# Patient Record
Sex: MALE | Race: WHITE | Employment: FULL TIME | URBAN - METROPOLITAN AREA
[De-identification: names, ages, dates, MRNs, and addresses within clinical notes are randomized per-mention and may not be internally consistent; named-entity substitution may affect disease eponyms.]

---

## 2018-04-09 ENCOUNTER — HOSPITAL ENCOUNTER (OUTPATIENT)
Age: 48
Discharge: HOME | End: 2018-04-09
Attending: EMERGENCY MEDICINE
Payer: COMMERCIAL

## 2018-04-09 VITALS
TEMPERATURE: 98 F | RESPIRATION RATE: 20 BRPM | HEIGHT: 71 IN | DIASTOLIC BLOOD PRESSURE: 105 MMHG | BODY MASS INDEX: 31.5 KG/M2 | HEART RATE: 98 BPM | OXYGEN SATURATION: 100 % | WEIGHT: 225 LBS | SYSTOLIC BLOOD PRESSURE: 188 MMHG

## 2018-04-09 DIAGNOSIS — M54.16 LUMBAR BACK PAIN WITH RADICULOPATHY AFFECTING RIGHT LOWER EXTREMITY: Primary | ICD-10-CM

## 2018-04-09 PROCEDURE — 99213 OFFICE O/P EST LOW 20 MIN: CPT

## 2018-04-09 PROCEDURE — 99204 OFFICE O/P NEW MOD 45 MIN: CPT

## 2018-04-09 RX ORDER — METHYLPREDNISOLONE 4 MG/1
TABLET ORAL
Qty: 1 PACKAGE | Refills: 0 | Status: SHIPPED | OUTPATIENT
Start: 2018-04-09 | End: 2018-05-15 | Stop reason: ALTCHOICE

## 2018-04-09 NOTE — ED NOTES
Patient present with back pain which has been an issue for him for years, however yesterday patient woke up with pain shooting down his right leg which is new for him.

## 2018-04-09 NOTE — ED PROVIDER NOTES
Patient Seen in: [facility]    History   Patient presents with:  Back Pain (musculoskeletal)    Stated Complaint: lowe back and righ leg pain    HPI  Patient reports being a have very  and having low esther use: Yes           12.0 oz/week     Standard drinks or equivalent: 20 per week      Review of Systems    Positive for stated complaint: lowe back and righ leg pain  Other systems are as noted in HPI. Constitutional and vital signs reviewed.       All other reflexes are 2+ on the right side and 2+ on the left side. Skin: Skin is warm and dry. No pallor. Psychiatric: He has a normal mood and affect. His behavior is normal.   Nursing note and vitals reviewed.       ED Course   Labs Reviewed - No data to displ

## 2018-04-12 ENCOUNTER — TELEPHONE (OUTPATIENT)
Dept: OTHER | Age: 48
End: 2018-04-12

## 2018-04-12 NOTE — TELEPHONE ENCOUNTER
Spouse states pt was seen in IC Monday for back pain and he would like doctor to prescribe stronger medication for his pain. Advised spouse to have call office as no MITCHEL available in EMR and further information is needed, she will have pt call office.

## 2018-04-13 NOTE — TELEPHONE ENCOUNTER
Pt stated he was seen at [facility] 4/9/18-RX -Steroid, few tablets left but still in large amount of Pain ,requesting Pain medication-sometimes it very intense

## 2018-04-13 NOTE — TELEPHONE ENCOUNTER
LMTCB. May transfer to Triage. (Noted pt does have appt scheduled for 4/17 with [facility]).

## 2018-04-14 ENCOUNTER — HOSPITAL ENCOUNTER (OUTPATIENT)
Age: 48
Discharge: HOME | End: 2018-04-14
Attending: FAMILY MEDICINE
Payer: COMMERCIAL

## 2018-04-14 VITALS
SYSTOLIC BLOOD PRESSURE: 167 MMHG | RESPIRATION RATE: 18 BRPM | DIASTOLIC BLOOD PRESSURE: 96 MMHG | HEART RATE: 81 BPM | TEMPERATURE: 98 F | OXYGEN SATURATION: 97 %

## 2018-04-14 DIAGNOSIS — M79.651 RIGHT THIGH PAIN: ICD-10-CM

## 2018-04-14 DIAGNOSIS — M54.41 ACUTE BACK PAIN WITH SCIATICA, RIGHT: Primary | ICD-10-CM

## 2018-04-14 PROCEDURE — 99214 OFFICE O/P EST MOD 30 MIN: CPT

## 2018-04-14 PROCEDURE — 99213 OFFICE O/P EST LOW 20 MIN: CPT

## 2018-04-14 PROCEDURE — 96372 THER/PROPH/DIAG INJ SC/IM: CPT

## 2018-04-14 RX ORDER — HYDROCODONE BITARTRATE AND ACETAMINOPHEN 5; 325 MG/1; MG/1
1-2 TABLET ORAL EVERY 4 HOURS PRN
Qty: 20 TABLET | Refills: 0 | Status: SHIPPED | OUTPATIENT
Start: 2018-04-14 | End: 2018-04-17 | Stop reason: ALTCHOICE

## 2018-04-14 RX ORDER — PREDNISONE 10 MG/1
TABLET ORAL
Qty: 21 TABLET | Refills: 0 | Status: SHIPPED | OUTPATIENT
Start: 2018-04-14 | End: 2018-05-15 | Stop reason: ALTCHOICE

## 2018-04-14 RX ORDER — METHYLPREDNISOLONE SODIUM SUCCINATE 125 MG/2ML
125 INJECTION, POWDER, LYOPHILIZED, FOR SOLUTION INTRAMUSCULAR; INTRAVENOUS ONCE
Status: COMPLETED | OUTPATIENT
Start: 2018-04-14 | End: 2018-04-14

## 2018-04-14 NOTE — ED PROVIDER NOTES
Patient Seen in: [facility] [place] History   CC:  Patient presents with:  Pain (neurologic)    Stated Complaint: Hip Pain, Lower Back Pain    ------------------------------  Per Rn: (paraphrase)    lbp, seen here recently, sp screening including reassessment of your blood pressure.     General Appearance:    Alert, cooperative, no distress, appears stated age   Head:    Normocephalic, without obvious abnormality, atraumatic   Eyes:    PERRL, conjunctiva/corneas clear, EOM's [place] Refills: 0    !! HYDROcodone-acetaminophen 5-325 MG Oral Tab  Take 1-2 tablets by mouth every 4 (four) hours as needed for Pain. Qty: 20 tablet Refills: 0    !! - Potential duplicate medications found. Please discuss with provider.

## 2018-04-14 NOTE — ED INITIAL ASSESSMENT (HPI)
C/O RIGHT HIP AND LOWER BACK PAIN SEEN HERE AT THE IC THIS PAST WEEK AND WAS PRESCRIBED A MEDROL DOSE PACK AND STATES THAT THE PAIN IS NOT IMPROVED AND HE IS UNABLE TO SLEEP DUE TO THE PAIN.

## 2018-04-17 ENCOUNTER — HOSPITAL ENCOUNTER (OUTPATIENT)
Dept: GENERAL RADIOLOGY | Age: 48
Discharge: HOME | End: 2018-04-17
Attending: FAMILY MEDICINE
Payer: COMMERCIAL

## 2018-04-17 ENCOUNTER — OFFICE VISIT (OUTPATIENT)
Dept: FAMILY MEDICINE CLINIC | Facility: CLINIC | Age: 48
End: 2018-04-17

## 2018-04-17 VITALS
WEIGHT: 203 LBS | HEART RATE: 89 BPM | BODY MASS INDEX: 28.42 KG/M2 | DIASTOLIC BLOOD PRESSURE: 116 MMHG | HEIGHT: 71 IN | SYSTOLIC BLOOD PRESSURE: 170 MMHG

## 2018-04-17 DIAGNOSIS — M54.16 LUMBAR RADICULOPATHY: ICD-10-CM

## 2018-04-17 DIAGNOSIS — M25.551 RIGHT HIP PAIN: ICD-10-CM

## 2018-04-17 DIAGNOSIS — I10 ESSENTIAL HYPERTENSION: Primary | ICD-10-CM

## 2018-04-17 DIAGNOSIS — I10 ESSENTIAL HYPERTENSION: ICD-10-CM

## 2018-04-17 PROCEDURE — 73502 X-RAY EXAM HIP UNI 2-3 VIEWS: CPT | Performed by: FAMILY MEDICINE

## 2018-04-17 PROCEDURE — 72100 X-RAY EXAM L-S SPINE 2/3 VWS: CPT | Performed by: FAMILY MEDICINE

## 2018-04-17 PROCEDURE — 99212 OFFICE O/P EST SF 10 MIN: CPT | Performed by: FAMILY MEDICINE

## 2018-04-17 PROCEDURE — 99213 OFFICE O/P EST LOW 20 MIN: CPT | Performed by: FAMILY MEDICINE

## 2018-04-17 RX ORDER — VALSARTAN 160 MG/1
160 TABLET ORAL DAILY
Qty: 30 TABLET | Refills: 1 | Status: SHIPPED | OUTPATIENT
Start: 2018-04-17 | End: 2018-06-07

## 2018-04-17 RX ORDER — HYDROCODONE BITARTRATE AND ACETAMINOPHEN 10; 325 MG/1; MG/1
1 TABLET ORAL EVERY 6 HOURS PRN
Qty: 30 TABLET | Refills: 0 | Status: SHIPPED | OUTPATIENT
Start: 2018-04-17 | End: 2018-04-24

## 2018-04-17 NOTE — PROGRESS NOTES
Blood pressure (!) 170/116, pulse 89, height 5' 11\" (1.803 m), weight 203 lb (92.1 kg). Patient presents today complaining of 9 days of hip and leg pain bilaterally. Chronic low back discomfort. Currently working a sedentary job.   Denies bowel or kira

## 2018-04-18 ENCOUNTER — TELEPHONE (OUTPATIENT)
Dept: FAMILY MEDICINE CLINIC | Facility: CLINIC | Age: 48
End: 2018-04-18

## 2018-04-18 NOTE — TELEPHONE ENCOUNTER
----- Message from Shirlene Buckley DO sent at 4/17/2018  7:49 PM CDT -----  XRAYS WITH NO ACUTE FINDINGS. PATIENT TO FU WITH DR. Celina Craft AS REFERRED.

## 2018-04-21 ENCOUNTER — LAB ENCOUNTER (OUTPATIENT)
Dept: LAB | Facility: HOSPITAL | Age: 48
End: 2018-04-21
Attending: FAMILY MEDICINE
Payer: COMMERCIAL

## 2018-04-21 DIAGNOSIS — I10 ESSENTIAL HYPERTENSION: ICD-10-CM

## 2018-04-21 DIAGNOSIS — I10 HTN (HYPERTENSION): Primary | ICD-10-CM

## 2018-04-21 PROCEDURE — 84450 TRANSFERASE (AST) (SGOT): CPT

## 2018-04-21 PROCEDURE — 93005 ELECTROCARDIOGRAM TRACING: CPT

## 2018-04-21 PROCEDURE — 85060 BLOOD SMEAR INTERPRETATION: CPT

## 2018-04-21 PROCEDURE — 81001 URINALYSIS AUTO W/SCOPE: CPT

## 2018-04-21 PROCEDURE — 80061 LIPID PANEL: CPT

## 2018-04-21 PROCEDURE — 93010 ELECTROCARDIOGRAM REPORT: CPT | Performed by: FAMILY MEDICINE

## 2018-04-21 PROCEDURE — 84443 ASSAY THYROID STIM HORMONE: CPT

## 2018-04-21 PROCEDURE — 85025 COMPLETE CBC W/AUTO DIFF WBC: CPT

## 2018-04-21 PROCEDURE — 80048 BASIC METABOLIC PNL TOTAL CA: CPT

## 2018-04-21 PROCEDURE — 36415 COLL VENOUS BLD VENIPUNCTURE: CPT

## 2018-04-21 PROCEDURE — 84460 ALANINE AMINO (ALT) (SGPT): CPT

## 2018-04-23 NOTE — TELEPHONE ENCOUNTER
Patient is asking for Norco 10/325 mg   He has apt to see ortho but not till Friday       Current Outpatient Prescriptions:  HYDROcodone-acetaminophen (NORCO)  MG Oral Tab Take 1 tablet by mouth every 6 (six) hours as needed for Pain.  Disp: 30 tablet

## 2018-04-23 NOTE — TELEPHONE ENCOUNTER
Pt called in requesting a refill for the following medication:   Pt states that he has an appt scheduled with an orthopaedic doctor for 4/27.

## 2018-04-24 ENCOUNTER — HOSPITAL ENCOUNTER (OUTPATIENT)
Age: 48
Discharge: HOME | End: 2018-04-24
Payer: COMMERCIAL

## 2018-04-24 VITALS
OXYGEN SATURATION: 98 % | HEIGHT: 71 IN | DIASTOLIC BLOOD PRESSURE: 95 MMHG | SYSTOLIC BLOOD PRESSURE: 153 MMHG | RESPIRATION RATE: 18 BRPM | BODY MASS INDEX: 28.42 KG/M2 | WEIGHT: 203 LBS | HEART RATE: 87 BPM | TEMPERATURE: 98 F

## 2018-04-24 DIAGNOSIS — M54.31 SCIATICA OF RIGHT SIDE: Primary | ICD-10-CM

## 2018-04-24 PROCEDURE — 99213 OFFICE O/P EST LOW 20 MIN: CPT

## 2018-04-24 PROCEDURE — 99214 OFFICE O/P EST MOD 30 MIN: CPT

## 2018-04-24 RX ORDER — CYCLOBENZAPRINE HCL 10 MG
10 TABLET ORAL 3 TIMES DAILY PRN
Qty: 20 TABLET | Refills: 0 | Status: SHIPPED | OUTPATIENT
Start: 2018-04-24 | End: 2018-05-01

## 2018-04-24 NOTE — ED PROVIDER NOTES
Patient presents with:  Back Pain (musculoskeletal)      HPI:     Ravi Calhoun is a 52year old male who presents for evaluation and management of a chief complaint of right-sided lower back pain that radiates to the anterior aspect of the right thigh.   Daniela Sarah Yes    Comment: Coffee 1 cup daily     Social History Narrative   None on file         ROS:   Positive for stated complaint: back pain, thigh pain  All other systems reviewed and negative except as noted above. Constitutional and Vital Signs Reviewed. prescribe him another course of Norco when he has a primary care doctor following his care along with an appointment with his orthopedic doctor in 2 days. He agrees with this plan of care.   I will prescribe him Flexeril and he will take anti-inflammatory

## 2018-04-24 NOTE — ED INITIAL ASSESSMENT (HPI)
Lower Back pain for 4 weeks that goes down to right leg. Denies any trauma to the area. Denies any voiding difficulties. Patient states hes been trying to refill medications. He states Norco was previous prescription and needs refill.  Patient has ortho gabby

## 2018-04-25 RX ORDER — HYDROCODONE BITARTRATE AND ACETAMINOPHEN 10; 325 MG/1; MG/1
1 TABLET ORAL EVERY 6 HOURS PRN
Qty: 20 TABLET | Refills: 0 | Status: SHIPPED | OUTPATIENT
Start: 2018-04-25

## 2018-05-15 ENCOUNTER — OFFICE VISIT (OUTPATIENT)
Dept: FAMILY MEDICINE CLINIC | Facility: CLINIC | Age: 48
End: 2018-05-15

## 2018-05-15 VITALS
SYSTOLIC BLOOD PRESSURE: 126 MMHG | DIASTOLIC BLOOD PRESSURE: 88 MMHG | WEIGHT: 202 LBS | BODY MASS INDEX: 28.28 KG/M2 | HEART RATE: 109 BPM | HEIGHT: 71 IN

## 2018-05-15 DIAGNOSIS — E11.9 DIABETES MELLITUS TYPE 2 IN NONOBESE (HCC): Primary | ICD-10-CM

## 2018-05-15 PROCEDURE — 99214 OFFICE O/P EST MOD 30 MIN: CPT | Performed by: FAMILY MEDICINE

## 2018-05-15 PROCEDURE — 99212 OFFICE O/P EST SF 10 MIN: CPT | Performed by: FAMILY MEDICINE

## 2018-05-15 RX ORDER — ASPIRIN 81 MG/1
81 TABLET ORAL DAILY
Qty: 30 TABLET | Refills: 0 | OUTPATIENT
Start: 2018-05-15

## 2018-05-15 RX ORDER — TRAMADOL HYDROCHLORIDE 50 MG/1
TABLET ORAL
Refills: 1 | COMMUNITY
Start: 2018-04-30

## 2018-05-15 RX ORDER — ATORVASTATIN CALCIUM 40 MG/1
40 TABLET, FILM COATED ORAL NIGHTLY
Qty: 30 TABLET | Refills: 3 | Status: SHIPPED | OUTPATIENT
Start: 2018-05-15 | End: 2018-07-06

## 2018-05-15 NOTE — PROGRESS NOTES
Blood pressure 126/88, pulse 109, height 5' 11\" (1.803 m), weight 202 lb (91.6 kg). Patient presents today following up for hypertension. Father had diabetes. He denies chest pain or dyspnea. He denies polyuria or polydipsia.   Denies any eye problem

## 2018-05-15 NOTE — PATIENT INSTRUCTIONS
Understanding Type 2 Diabetes  When your body is working normally, the food you eat is digested and used as fuel. This fuel supplies energy to the body’s cells. When you have diabetes, the fuel can’t enter the cells.  Without treatment, diabetes can cause If high blood sugar is not controlled, blood vessels throughout the body become damaged. Prolonged high blood sugar affects organs, blood vessels, and nerves. As a result, the risks of damage to the heart, kidneys, eyes, and limbs increase.  Diabetes also m ¨ Meat, poultry, fish, or tofu  ¨ Healthy fats  · Check your blood sugar levels as directed by your provider. Take any medicine as prescribed by your provider. · Learn to read food labels and pick the right portion sizes.   · Eat only the amount of food in

## 2018-06-07 RX ORDER — VALSARTAN 160 MG/1
160 TABLET ORAL DAILY
Qty: 30 TABLET | Refills: 1 | Status: SHIPPED | OUTPATIENT
Start: 2018-06-07 | End: 2018-06-11

## 2018-06-07 RX ORDER — VALSARTAN 160 MG/1
160 TABLET ORAL DAILY
Qty: 90 TABLET | Refills: 0 | OUTPATIENT
Start: 2018-06-07

## 2018-06-07 NOTE — TELEPHONE ENCOUNTER
Hypertensive Medications  Protocol Criteria:  · Appointment scheduled in the past 6 months or in the next 3 months  · BMP or CMP in the past 12 months  · Creatinine result < 2  Recent Outpatient Visits            3 weeks ago Diabetes mellitus type 2 in non

## 2018-06-11 RX ORDER — VALSARTAN 160 MG/1
160 TABLET ORAL DAILY
Qty: 90 TABLET | Refills: 0 | Status: SHIPPED | OUTPATIENT
Start: 2018-06-11 | End: 2018-09-10

## 2018-06-11 NOTE — TELEPHONE ENCOUNTER
Pharmacy called stating that Pt's insurance is requiring a 90 day supply for medication below. Script was sent on 6/7/18 but only for 30 days. Please advise.        Current Outpatient Prescriptions:   •  valsartan 160 MG Oral Tab, Take 1 tablet (160 mg tota

## 2018-06-11 NOTE — TELEPHONE ENCOUNTER
Hypertensive Medications Protocol Passed6/11 1:24 PM   CMP or BMP in past 12 months    Serum Creatinine < 2.0    Appointment in past 6 or next 3 months     Hypertensive Medications  Protocol Criteria:  · Appointment scheduled in the past 6 months or in the

## 2018-07-09 RX ORDER — ATORVASTATIN CALCIUM 40 MG/1
40 TABLET, FILM COATED ORAL NIGHTLY
Qty: 90 TABLET | Refills: 3 | Status: SHIPPED | OUTPATIENT
Start: 2018-07-09 | End: 2019-01-09

## 2018-09-10 RX ORDER — OLMESARTAN MEDOXOMIL 20 MG/1
20 TABLET ORAL DAILY
Qty: 30 TABLET | Refills: 2 | Status: SHIPPED | OUTPATIENT
Start: 2018-09-10 | End: 2018-10-05

## 2018-09-10 RX ORDER — VALSARTAN 160 MG/1
TABLET ORAL
Qty: 90 TABLET | Refills: 0 | OUTPATIENT
Start: 2018-09-10

## 2018-09-11 NOTE — TELEPHONE ENCOUNTER
KHARI, Please help assist pt with scheduling an appointment with .  Thank you    Refusal reason: Appt required, please call patient

## 2018-10-01 ENCOUNTER — LAB ENCOUNTER (OUTPATIENT)
Dept: LAB | Age: 48
End: 2018-10-01
Attending: FAMILY MEDICINE
Payer: COMMERCIAL

## 2018-10-01 DIAGNOSIS — E11.9 DIABETES MELLITUS TYPE 2 IN NONOBESE (HCC): ICD-10-CM

## 2018-10-01 DIAGNOSIS — R73.9 HYPERGLYCEMIA: ICD-10-CM

## 2018-10-01 PROCEDURE — 82570 ASSAY OF URINE CREATININE: CPT

## 2018-10-01 PROCEDURE — 85025 COMPLETE CBC W/AUTO DIFF WBC: CPT

## 2018-10-01 PROCEDURE — 82043 UR ALBUMIN QUANTITATIVE: CPT

## 2018-10-01 PROCEDURE — 80048 BASIC METABOLIC PNL TOTAL CA: CPT

## 2018-10-01 PROCEDURE — 83036 HEMOGLOBIN GLYCOSYLATED A1C: CPT

## 2018-10-01 PROCEDURE — 36415 COLL VENOUS BLD VENIPUNCTURE: CPT

## 2018-10-05 ENCOUNTER — OFFICE VISIT (OUTPATIENT)
Dept: FAMILY MEDICINE CLINIC | Facility: CLINIC | Age: 48
End: 2018-10-05
Payer: COMMERCIAL

## 2018-10-05 VITALS
DIASTOLIC BLOOD PRESSURE: 107 MMHG | HEART RATE: 103 BPM | BODY MASS INDEX: 28.56 KG/M2 | WEIGHT: 204 LBS | HEIGHT: 71 IN | SYSTOLIC BLOOD PRESSURE: 152 MMHG

## 2018-10-05 DIAGNOSIS — E11.9 DIABETES MELLITUS TYPE 2 IN NONOBESE (HCC): Primary | ICD-10-CM

## 2018-10-05 PROCEDURE — 90472 IMMUNIZATION ADMIN EACH ADD: CPT | Performed by: FAMILY MEDICINE

## 2018-10-05 PROCEDURE — 90471 IMMUNIZATION ADMIN: CPT | Performed by: FAMILY MEDICINE

## 2018-10-05 PROCEDURE — 90670 PCV13 VACCINE IM: CPT | Performed by: FAMILY MEDICINE

## 2018-10-05 PROCEDURE — 90715 TDAP VACCINE 7 YRS/> IM: CPT | Performed by: FAMILY MEDICINE

## 2018-10-05 PROCEDURE — 99212 OFFICE O/P EST SF 10 MIN: CPT | Performed by: FAMILY MEDICINE

## 2018-10-05 PROCEDURE — 99213 OFFICE O/P EST LOW 20 MIN: CPT | Performed by: FAMILY MEDICINE

## 2018-10-05 RX ORDER — TERBINAFINE HYDROCHLORIDE 250 MG/1
250 TABLET ORAL DAILY
Qty: 90 TABLET | Refills: 0 | Status: SHIPPED | OUTPATIENT
Start: 2018-10-05 | End: 2019-01-03

## 2018-10-05 RX ORDER — OLMESARTAN MEDOXOMIL 20 MG/1
20 TABLET ORAL DAILY
Qty: 90 TABLET | Refills: 3 | Status: SHIPPED | OUTPATIENT
Start: 2018-10-05 | End: 2019-04-10

## 2018-10-05 NOTE — PROGRESS NOTES
Blood pressure (!) 152/107, pulse 103, height 5' 11\" (1.803 m), weight 204 lb (92.5 kg). Following up for hyperglycemia history of diabetes and hyperlipidemia. No chest pain and no dyspnea. Ran out of blood pressure medication.   Denies visual complai

## 2018-11-27 NOTE — TELEPHONE ENCOUNTER
Fax received requesting refill for 90 day supply. Current Outpatient Medications:  MetFORMIN HCl 500 MG Oral Tab Take 1 tablet (500 mg total) by mouth 2 (two) times daily with meals.  Disp: 60 tablet Rfl: 03

## 2018-11-29 NOTE — TELEPHONE ENCOUNTER
Refill passed per [facility], [facility] protocol.   Diabetes Medications  Protocol Criteria:  · Appointment scheduled in the past 6 months or the next 3 months  · A1C < 7.5 in the past 6 months  · Creatinine in the past 12 months  · Creatinine result < 1.5   Rece

## 2019-01-09 ENCOUNTER — OFFICE VISIT (OUTPATIENT)
Dept: FAMILY MEDICINE CLINIC | Facility: CLINIC | Age: 49
End: 2019-01-09
Payer: COMMERCIAL

## 2019-01-09 VITALS
SYSTOLIC BLOOD PRESSURE: 133 MMHG | HEIGHT: 71 IN | WEIGHT: 207 LBS | BODY MASS INDEX: 28.98 KG/M2 | DIASTOLIC BLOOD PRESSURE: 88 MMHG | HEART RATE: 109 BPM

## 2019-01-09 DIAGNOSIS — E11.9 DIABETES MELLITUS TYPE 2 IN NONOBESE (HCC): Primary | ICD-10-CM

## 2019-01-09 DIAGNOSIS — E11.42 DIABETIC POLYNEUROPATHY ASSOCIATED WITH TYPE 2 DIABETES MELLITUS (HCC): ICD-10-CM

## 2019-01-09 DIAGNOSIS — M25.551 RIGHT HIP PAIN: ICD-10-CM

## 2019-01-09 DIAGNOSIS — I10 ESSENTIAL HYPERTENSION: ICD-10-CM

## 2019-01-09 PROCEDURE — 99214 OFFICE O/P EST MOD 30 MIN: CPT | Performed by: FAMILY MEDICINE

## 2019-01-09 PROCEDURE — 99212 OFFICE O/P EST SF 10 MIN: CPT | Performed by: FAMILY MEDICINE

## 2019-01-09 RX ORDER — MELOXICAM 15 MG/1
15 TABLET ORAL DAILY
Qty: 30 TABLET | Refills: 1 | Status: SHIPPED | OUTPATIENT
Start: 2019-01-09 | End: 2019-03-05

## 2019-01-09 RX ORDER — PREGABALIN 75 MG/1
75 CAPSULE ORAL 2 TIMES DAILY
Qty: 60 CAPSULE | Refills: 1 | Status: SHIPPED | OUTPATIENT
Start: 2019-01-09 | End: 2019-03-27

## 2019-01-09 RX ORDER — SILDENAFIL 50 MG/1
50 TABLET, FILM COATED ORAL
Qty: 12 TABLET | Refills: 0 | Status: SHIPPED | OUTPATIENT
Start: 2019-01-09 | End: 2019-05-01

## 2019-01-09 RX ORDER — ATORVASTATIN CALCIUM 40 MG/1
40 TABLET, FILM COATED ORAL NIGHTLY
Qty: 90 TABLET | Refills: 3 | Status: SHIPPED | OUTPATIENT
Start: 2019-01-09 | End: 2020-09-28

## 2019-01-09 NOTE — PROGRESS NOTES
Blood pressure 133/88, pulse 109, height 5' 11\" (1.803 m), weight 207 lb (93.9 kg). Patient presents today complaining of bilateral lower extremity numbness and tingling. Reports this is been going on for several months.   It does not keep him awake at

## 2019-01-15 RX ORDER — TERBINAFINE HYDROCHLORIDE 250 MG/1
TABLET ORAL
Qty: 90 TABLET | Refills: 0 | OUTPATIENT
Start: 2019-01-15

## 2019-01-25 ENCOUNTER — OFFICE VISIT (OUTPATIENT)
Dept: OPTOMETRY | Facility: CLINIC | Age: 49
End: 2019-01-25
Payer: COMMERCIAL

## 2019-01-25 DIAGNOSIS — H52.4 PRESBYOPIA: ICD-10-CM

## 2019-01-25 DIAGNOSIS — E11.9 CONTROLLED TYPE 2 DIABETES MELLITUS WITHOUT COMPLICATION, WITHOUT LONG-TERM CURRENT USE OF INSULIN (HCC): Primary | ICD-10-CM

## 2019-01-25 PROCEDURE — 92004 COMPRE OPH EXAM NEW PT 1/>: CPT | Performed by: OPTOMETRIST

## 2019-01-25 NOTE — PATIENT INSTRUCTIONS
Presbyopia  Patient can continue with his own RX reading glasses or buy +1.50 OTC reading glasses.     Controlled type 2 diabetes mellitus without complication, without long-term current use of insulin ([address]  I advised patient that there is no background di

## 2019-01-25 NOTE — PROGRESS NOTES
Howard Hobson is a 50year old male. HPI:     HPI     Diabetic Eye Exam     Diabetes characteristics include Type 2, controlled with diet and taking oral medications. Duration of 6 months. Number of years diabetic: 6 months.   Number of years on pills: daily. Disp: 90 tablet Rfl: 3   aspirin (ECOTRIN LOW STRENGTH) 81 MG Oral Tab EC Take 1 tablet (81 mg total) by mouth daily.  Disp: 30 tablet Rfl: 0   TraMADol HCl 50 MG Oral Tab  Disp:  Rfl: 1   HYDROcodone-acetaminophen (NORCO)  MG Oral Tab Take 1 t Manifest Refraction (Auto)       Sphere Cylinder Axis    Right +0.25 -0.50 040    Left Big Arm Sphere    Declines--happy with his reading glasses or he can use +1.50 OTC reading glasses.                   ASSESSMENT/PLAN:     Diagnoses and Plan:     Pre

## 2019-03-05 RX ORDER — MELOXICAM 15 MG/1
TABLET ORAL
Qty: 30 TABLET | Refills: 1 | Status: SHIPPED | OUTPATIENT
Start: 2019-03-05 | End: 2019-04-10

## 2019-03-07 RX ORDER — MELOXICAM 15 MG/1
15 TABLET ORAL
Qty: 30 TABLET | Refills: 1 | Status: CANCELLED | OUTPATIENT
Start: 2019-03-07

## 2019-03-08 NOTE — TELEPHONE ENCOUNTER
Requesting Meloxicam refill, noted med refilled. Left VM message informing pt prescription was refilled and to call back if he has any questions.

## 2019-04-10 ENCOUNTER — TELEPHONE (OUTPATIENT)
Dept: OTHER | Age: 49
End: 2019-04-10

## 2019-04-10 ENCOUNTER — TELEPHONE (OUTPATIENT)
Dept: FAMILY MEDICINE CLINIC | Facility: CLINIC | Age: 49
End: 2019-04-10

## 2019-04-10 RX ORDER — PREGABALIN 75 MG/1
75 CAPSULE ORAL 3 TIMES DAILY
Qty: 90 CAPSULE | Refills: 3 | Status: SHIPPED | OUTPATIENT
Start: 2019-04-10 | End: 2019-05-14

## 2019-04-10 RX ORDER — TELMISARTAN 40 MG/1
40 TABLET ORAL DAILY
Qty: 30 TABLET | Refills: 2 | Status: SHIPPED | OUTPATIENT
Start: 2019-04-10 | End: 2019-05-07

## 2019-04-10 RX ORDER — NAPROXEN 500 MG/1
500 TABLET ORAL 2 TIMES DAILY WITH MEALS
Qty: 60 TABLET | Refills: 1 | Status: SHIPPED | OUTPATIENT
Start: 2019-04-10 | End: 2019-06-06

## 2019-04-10 NOTE — TELEPHONE ENCOUNTER
Received call from pt requesting for a PA on MELOXICAM 15 MG Oral Tab. Patient has picked up med from the pharmacy and paid out of pocket, just wanting to have a PA on file for future refills.  Confirmed ins on file with patient and explained PA process to

## 2019-04-10 NOTE — TELEPHONE ENCOUNTER
Please phone in Anapsisa rx entered also advise patient that olmesartan is on back order for all local pharmacies.

## 2019-04-10 NOTE — TELEPHONE ENCOUNTER
Pt called in stating that his pharmacy is completely out of stock and advised him to contact the office to get either new script or send it to a different pharmacy (they did not advise who would have the medication). Please advise.     Current Outpatient

## 2019-04-10 NOTE — TELEPHONE ENCOUNTER
Incoming call from patient stating his Olmesartan Medoxomil is currently on back order at his local pharmacy, pt stated he would like for us to check and see which pharmacy might have the med in stock.  Patient does not want to change his medication, states

## 2019-04-11 NOTE — TELEPHONE ENCOUNTER
LMTCB-transfer to triage. Once patent calls back,,please see Dr Caro Patch notes below. Lyrica already phoned in today. Note      Please phone in lyrica rx entered also advise patient that olmesartan is on back order for all local pharmacies.             Zhou Roy

## 2019-04-11 NOTE — TELEPHONE ENCOUNTER
Patient informed of provider recommendations/ response below.  Patient voiced understanding and agrees    Confirmed meds at [facility], [facility]'s  preferred pharmacy

## 2019-04-13 NOTE — TELEPHONE ENCOUNTER
Plan states no PA is required, medication is covered on the patient's pharmacy benefit plan. [facility] pharmacy notified.

## 2019-05-01 ENCOUNTER — TELEPHONE (OUTPATIENT)
Dept: FAMILY MEDICINE CLINIC | Facility: CLINIC | Age: 49
End: 2019-05-01

## 2019-05-01 RX ORDER — SILDENAFIL 50 MG/1
50 TABLET, FILM COATED ORAL
Qty: 12 TABLET | Refills: 0 | Status: SHIPPED | OUTPATIENT
Start: 2019-05-01 | End: 2020-09-28

## 2019-05-02 RX ORDER — MELOXICAM 15 MG/1
TABLET ORAL
Qty: 30 TABLET | Refills: 1 | Status: SHIPPED | OUTPATIENT
Start: 2019-05-02

## 2019-05-02 NOTE — TELEPHONE ENCOUNTER
[facility]- please advise.  Protocol passed but not on active med list?     Refill Protocol Appointment Criteria  · Appointment scheduled in the past 6 months or in the next 3 months  Recent Outpatient Visits            3 months ago Controlled type 2 diabetes m

## 2019-05-03 NOTE — TELEPHONE ENCOUNTER
Spoke with [facility] pharmacy--states out of pocket price for #12 Sildenafil would be $131.00 with discount    LMTCB for [facility] out of [facility] price--patient may find less expensive at different pharmacy though [facility]--can transfer Rx to another pharmacy if he so chooses.

## 2019-05-06 NOTE — TELEPHONE ENCOUNTER
The patient called back and was informed. He will contact his insurance company to see what is covered and what pharmacy he should go to. He will call us back with the information.

## 2019-05-07 RX ORDER — TELMISARTAN 40 MG/1
40 TABLET ORAL DAILY
Qty: 90 TABLET | Refills: 1 | Status: SHIPPED | OUTPATIENT
Start: 2019-05-07 | End: 2019-05-14

## 2019-05-07 NOTE — TELEPHONE ENCOUNTER
Patient needs refill for     telmisartan 40 MG Oral Tab Take 1 tablet (40 mg total) by mouth daily. Disp: 30 tablet Rfl: 2     Patient has 3 pills left and has appt scheduled for 05/14/2019.

## 2019-05-07 NOTE — TELEPHONE ENCOUNTER
PA for Sildenafil Citrate 50 mg tab completed with [facility] via CMM response time 24-72 hours KEY [id number].

## 2019-05-07 NOTE — TELEPHONE ENCOUNTER
Per Spouse insurance states will cover     Sildenafil Citrate (VIAGRA) 50 MG Oral Tab Take 1 tablet (50 mg total) by mouth daily as needed for Erectile Dysfunction. Disp: 12 tablet Rfl: 0     With Prior Authorization.

## 2019-05-14 ENCOUNTER — OFFICE VISIT (OUTPATIENT)
Dept: FAMILY MEDICINE CLINIC | Facility: CLINIC | Age: 49
End: 2019-05-14
Payer: COMMERCIAL

## 2019-05-14 VITALS
BODY MASS INDEX: 31.41 KG/M2 | DIASTOLIC BLOOD PRESSURE: 94 MMHG | SYSTOLIC BLOOD PRESSURE: 144 MMHG | HEIGHT: 71 IN | WEIGHT: 224.38 LBS | HEART RATE: 86 BPM

## 2019-05-14 DIAGNOSIS — I10 ESSENTIAL HYPERTENSION: Primary | ICD-10-CM

## 2019-05-14 DIAGNOSIS — E11.42 DIABETIC POLYNEUROPATHY ASSOCIATED WITH TYPE 2 DIABETES MELLITUS (HCC): ICD-10-CM

## 2019-05-14 PROCEDURE — 99214 OFFICE O/P EST MOD 30 MIN: CPT | Performed by: FAMILY MEDICINE

## 2019-05-14 PROCEDURE — 99212 OFFICE O/P EST SF 10 MIN: CPT | Performed by: FAMILY MEDICINE

## 2019-05-14 RX ORDER — TELMISARTAN 80 MG/1
80 TABLET ORAL DAILY
Qty: 30 TABLET | Refills: 2 | Status: SHIPPED | OUTPATIENT
Start: 2019-05-14 | End: 2019-06-28

## 2019-05-14 RX ORDER — NAPROXEN 500 MG/1
TABLET ORAL
Refills: 1 | COMMUNITY
Start: 2019-05-08

## 2019-05-14 RX ORDER — GABAPENTIN 300 MG/1
300 CAPSULE ORAL 3 TIMES DAILY
Qty: 90 CAPSULE | Refills: 2 | Status: SHIPPED | OUTPATIENT
Start: 2019-05-14 | End: 2019-08-22

## 2019-05-14 NOTE — PROGRESS NOTES
Blood pressure (!) 144/94, pulse 86, height 5' 11\" (1.803 m), weight 224 lb 6 oz (101.8 kg). Patient presents today following up for diabetes, hypertension and diabetic neuropathy. Did not notice improvement using the Lyrica.   He reports that he does

## 2019-05-14 NOTE — PATIENT INSTRUCTIONS
ALPHA LIPOIC ACID 600MG DAILY OTC     [facility] [phone]    STOP LYRICA    INCREASED TELMISARTAN

## 2019-05-15 PROBLEM — E11.42 DIABETIC POLYNEUROPATHY ASSOCIATED WITH TYPE 2 DIABETES MELLITUS (HCC): Status: ACTIVE | Noted: 2019-05-15

## 2019-05-23 NOTE — TELEPHONE ENCOUNTER
The PA was denied a couple weeks back and I didn't see a response to Noelle's message that she sent but I saw pt had an appt with you after that so wondered if the discussion came up about the Sildenafil

## 2019-06-05 ENCOUNTER — APPOINTMENT (OUTPATIENT)
Dept: LAB | Age: 49
End: 2019-06-05
Attending: FAMILY MEDICINE
Payer: COMMERCIAL

## 2019-06-05 DIAGNOSIS — E11.9 DIABETES MELLITUS TYPE 2 IN NONOBESE (HCC): ICD-10-CM

## 2019-06-05 DIAGNOSIS — E11.42 DIABETIC POLYNEUROPATHY ASSOCIATED WITH TYPE 2 DIABETES MELLITUS (HCC): ICD-10-CM

## 2019-06-05 PROCEDURE — 84450 TRANSFERASE (AST) (SGOT): CPT

## 2019-06-05 PROCEDURE — 80061 LIPID PANEL: CPT

## 2019-06-05 PROCEDURE — 84443 ASSAY THYROID STIM HORMONE: CPT

## 2019-06-05 PROCEDURE — 83036 HEMOGLOBIN GLYCOSYLATED A1C: CPT

## 2019-06-05 PROCEDURE — 84460 ALANINE AMINO (ALT) (SGPT): CPT

## 2019-06-05 PROCEDURE — 80048 BASIC METABOLIC PNL TOTAL CA: CPT

## 2019-06-05 PROCEDURE — 36415 COLL VENOUS BLD VENIPUNCTURE: CPT

## 2019-06-14 DIAGNOSIS — E11.42 DIABETIC POLYNEUROPATHY ASSOCIATED WITH TYPE 2 DIABETES MELLITUS (HCC): ICD-10-CM

## 2019-06-14 RX ORDER — TELMISARTAN 80 MG/1
TABLET ORAL
Qty: 30 TABLET | Refills: 0 | OUTPATIENT
Start: 2019-06-14

## 2019-06-14 RX ORDER — GABAPENTIN 300 MG/1
CAPSULE ORAL
Qty: 90 CAPSULE | Refills: 0 | OUTPATIENT
Start: 2019-06-14

## 2019-06-17 RX ORDER — NAPROXEN 500 MG/1
TABLET ORAL
Qty: 60 TABLET | Refills: 0 | Status: SHIPPED | OUTPATIENT
Start: 2019-06-17 | End: 2019-07-23

## 2019-06-28 DIAGNOSIS — E11.42 DIABETIC POLYNEUROPATHY ASSOCIATED WITH TYPE 2 DIABETES MELLITUS (HCC): ICD-10-CM

## 2019-06-28 RX ORDER — TELMISARTAN 80 MG/1
80 TABLET ORAL DAILY
Qty: 90 TABLET | Refills: 0 | Status: SHIPPED | OUTPATIENT
Start: 2019-06-28 | End: 2019-09-22

## 2019-06-28 NOTE — TELEPHONE ENCOUNTER
Spoke with Chuy Trinidad from [address] [facility] in [facility] who wanted to confirm the dosage of the telmisartan the patient is suppose to be on. Chuy Trinidad made aware that the patient is suppose to be on 80mg. Chuy Trinidad confirmed the patient  only picked up a 30 day supply from the rx dated 5/14/19, and only has a 60 day supply available on file. Chuy Trinidad reports the patient's insurance will only pay for a 90 day supply of the medication. Updated rx sent to pharmacy. CALL CENTER PLEASE CALL PATIENT TO SCHEDULE A FOLLOW-UP APPOINTMENT WITH DR. Deuce Jarquin.

## 2019-07-15 DIAGNOSIS — E11.42 DIABETIC POLYNEUROPATHY ASSOCIATED WITH TYPE 2 DIABETES MELLITUS (HCC): ICD-10-CM

## 2019-07-15 RX ORDER — GABAPENTIN 300 MG/1
CAPSULE ORAL
Qty: 90 CAPSULE | Refills: 2 | OUTPATIENT
Start: 2019-07-15

## 2019-07-23 RX ORDER — NAPROXEN 500 MG/1
TABLET ORAL
Qty: 60 TABLET | Refills: 0 | Status: SHIPPED | OUTPATIENT
Start: 2019-07-23 | End: 2019-08-25

## 2019-07-23 NOTE — TELEPHONE ENCOUNTER
Please advise on refill request.     Refill Protocol Appointment Criteria  · Appointment scheduled in the past 6 months or in the next 3 months  Recent Outpatient Visits            2 months ago [facility]ential [facility]    [facility]

## 2019-08-22 DIAGNOSIS — E11.42 DIABETIC POLYNEUROPATHY ASSOCIATED WITH TYPE 2 DIABETES MELLITUS (HCC): ICD-10-CM

## 2019-08-22 RX ORDER — GABAPENTIN 300 MG/1
300 CAPSULE ORAL 3 TIMES DAILY
Qty: 90 CAPSULE | Refills: 1 | Status: SHIPPED | OUTPATIENT
Start: 2019-08-22

## 2019-08-22 NOTE — TELEPHONE ENCOUNTER
Pharmacy is requesting 90 day supply refill        gabapentin 300 MG Oral Cap Take 1 capsule (300 mg total) by mouth 3 (three) times daily.  Disp: 90 capsule Rfl: 2

## 2019-08-23 NOTE — TELEPHONE ENCOUNTER
Refill passed per [facility] protocol.   Refill Protocol Appointment Criteria  · Appointment scheduled in the past 6 months or in the next 3 months  Recent Outpatient Visits            3 months ago Essential hypertension    [address]

## 2019-08-27 RX ORDER — NAPROXEN 500 MG/1
TABLET ORAL
Qty: 60 TABLET | Refills: 0 | Status: SHIPPED | OUTPATIENT
Start: 2019-08-27 | End: 2019-09-27

## 2019-08-27 NOTE — TELEPHONE ENCOUNTER
Refill passed per [facility] protocol.   Refill Protocol Appointment Criteria  · Appointment scheduled in the past 6 months or in the next 3 months  Recent Outpatient Visits            3 months ago Essential hypertension    [address]

## 2019-09-22 DIAGNOSIS — E11.42 DIABETIC POLYNEUROPATHY ASSOCIATED WITH TYPE 2 DIABETES MELLITUS (HCC): ICD-10-CM

## 2019-09-23 RX ORDER — TELMISARTAN 80 MG/1
TABLET ORAL
Qty: 90 TABLET | Refills: 1 | Status: SHIPPED | OUTPATIENT
Start: 2019-09-23 | End: 2020-03-27

## 2019-09-29 NOTE — TELEPHONE ENCOUNTER
Dr Sánchez=please clarify if you want the patient to continue this medication, was originally order for 21 days only but we've been refilling it every month. Please change SIG if needed before signing it. Refill passed per [facility], [facility] protocol.     R

## 2019-09-30 RX ORDER — NAPROXEN 500 MG/1
TABLET ORAL
Qty: 60 TABLET | Refills: 0 | Status: SHIPPED | OUTPATIENT
Start: 2019-09-30 | End: 2019-12-02

## 2019-12-03 RX ORDER — NAPROXEN 500 MG/1
TABLET ORAL
Qty: 60 TABLET | Refills: 1 | Status: SHIPPED | OUTPATIENT
Start: 2019-12-03 | End: 2020-04-07

## 2020-01-30 RX ORDER — NAPROXEN 500 MG/1
TABLET ORAL
Qty: 60 TABLET | Refills: 1 | OUTPATIENT
Start: 2020-01-30

## 2020-01-30 NOTE — TELEPHONE ENCOUNTER
Left message to call back RE: Naproxen refill request    Dr. Esthela Evans to reach patient x 3 attempts    Please advise if patient is to continue Naproxen Rx or needs f/u appt    Refill Protocol Appointment Criteria  · Appointment scheduled in the pas

## 2020-03-27 DIAGNOSIS — E11.42 DIABETIC POLYNEUROPATHY ASSOCIATED WITH TYPE 2 DIABETES MELLITUS (HCC): ICD-10-CM

## 2020-03-27 RX ORDER — TELMISARTAN 80 MG/1
TABLET ORAL
Qty: 90 TABLET | Refills: 0 | Status: SHIPPED | OUTPATIENT
Start: 2020-03-27 | End: 2020-09-28

## 2020-04-07 RX ORDER — NAPROXEN 500 MG/1
TABLET ORAL
Qty: 60 TABLET | Refills: 1 | Status: SHIPPED | OUTPATIENT
Start: 2020-04-07 | End: 2020-06-02

## 2020-04-07 RX ORDER — MELOXICAM 15 MG/1
15 TABLET ORAL DAILY
Qty: 30 TABLET | Refills: 1 | OUTPATIENT
Start: 2020-04-07

## 2020-04-07 NOTE — TELEPHONE ENCOUNTER
This is being sent to you without review by the Triage staff due to the high call volumes created by the COVID-19 virus, per the email sent by Dr. Cho Or on 3/19/20. Thank you for your support.     Centralized Nurse Triage Team

## 2020-04-07 NOTE — TELEPHONE ENCOUNTER
Patient's wife is requesting 2 refills. NAPROXEN 500 MG Oral Tab 60 tablet 1 12/3/2019    Sig:   TAKE 1 TABLET BY MOUTH TWICE A DAY WITH MEALS.  FOR PAIN AND INFLAMMATION     Route:   (none)     Order #:   [id number]       MELOXICAM 15 MG Oral Tab 30 tabl

## 2020-06-02 RX ORDER — NAPROXEN 500 MG/1
TABLET ORAL
Qty: 60 TABLET | Refills: 0 | Status: SHIPPED | OUTPATIENT
Start: 2020-06-02 | End: 2020-06-29

## 2020-06-25 DIAGNOSIS — E11.42 DIABETIC POLYNEUROPATHY ASSOCIATED WITH TYPE 2 DIABETES MELLITUS (HCC): ICD-10-CM

## 2020-06-25 RX ORDER — TELMISARTAN 80 MG/1
TABLET ORAL
Qty: 90 TABLET | Refills: 0 | OUTPATIENT
Start: 2020-06-25

## 2020-06-29 RX ORDER — NAPROXEN 500 MG/1
TABLET ORAL
Qty: 60 TABLET | Refills: 0 | Status: SHIPPED | OUTPATIENT
Start: 2020-06-29 | End: 2020-07-30

## 2020-07-30 RX ORDER — NAPROXEN 500 MG/1
TABLET ORAL
Qty: 60 TABLET | Refills: 0 | Status: SHIPPED | OUTPATIENT
Start: 2020-07-30 | End: 2020-09-01

## 2020-09-01 RX ORDER — NAPROXEN 500 MG/1
TABLET ORAL
Qty: 60 TABLET | Refills: 0 | Status: SHIPPED | OUTPATIENT
Start: 2020-09-01 | End: 2020-10-05

## 2020-09-28 DIAGNOSIS — E11.42 DIABETIC POLYNEUROPATHY ASSOCIATED WITH TYPE 2 DIABETES MELLITUS (HCC): ICD-10-CM

## 2020-09-28 RX ORDER — SILDENAFIL 50 MG/1
50 TABLET, FILM COATED ORAL
Qty: 12 TABLET | Refills: 0 | Status: SHIPPED | OUTPATIENT
Start: 2020-09-28

## 2020-09-28 RX ORDER — TELMISARTAN 80 MG/1
80 TABLET ORAL DAILY
Qty: 90 TABLET | Refills: 0 | Status: SHIPPED | OUTPATIENT
Start: 2020-09-28 | End: 2021-01-04

## 2020-09-28 RX ORDER — ATORVASTATIN CALCIUM 40 MG/1
40 TABLET, FILM COATED ORAL NIGHTLY
Qty: 90 TABLET | Refills: 0 | Status: SHIPPED | OUTPATIENT
Start: 2020-09-28 | End: 2021-01-29

## 2020-09-28 NOTE — TELEPHONE ENCOUNTER
-  Please see message below for refill request.  LOV 5-    Telmisartan 80mg last filled 3-  Atorvastatin 40mg last filled 1-9-2019  Viagra 1-9-2019

## 2020-09-28 NOTE — TELEPHONE ENCOUNTER
Patient's wife is requesting refill, states pharmacy will not initiate refill request. Send prescriptions to [facility] pharmacy in [place]        TELMISARTAN 80 MG Oral Tab    atorvastatin 40 MG Oral Tab    Sildenafil Citrate (VIAGRA) 50 MG Oral Tab

## 2020-10-05 RX ORDER — NAPROXEN 500 MG/1
TABLET ORAL
Qty: 60 TABLET | Refills: 0 | Status: SHIPPED | OUTPATIENT
Start: 2020-10-05

## 2021-01-02 ENCOUNTER — TELEPHONE (OUTPATIENT)
Dept: FAMILY MEDICINE CLINIC | Facility: CLINIC | Age: 51
End: 2021-01-02

## 2021-01-02 DIAGNOSIS — E11.42 DIABETIC POLYNEUROPATHY ASSOCIATED WITH TYPE 2 DIABETES MELLITUS (HCC): ICD-10-CM

## 2021-01-02 RX ORDER — TELMISARTAN 80 MG/1
80 TABLET ORAL DAILY
Qty: 90 TABLET | Refills: 0 | Status: CANCELLED | OUTPATIENT
Start: 2021-01-02

## 2021-01-02 NOTE — TELEPHONE ENCOUNTER
Patient's wife states her  has had a painful left ear for approximately one week (patient not currently home) and requesting ear drops.   Informed wife that because patient has not been seen in over one year and no provider currently available today,

## 2021-01-03 NOTE — TELEPHONE ENCOUNTER
Reviewed pt's chart, he did not present to an [address] [place] [address] or Immediate Care. Re-routing to Dr. Prudy Peabody on call this weekend. No upcoming appts made with our doctors.

## 2021-01-04 RX ORDER — TELMISARTAN 80 MG/1
80 TABLET ORAL DAILY
Qty: 30 TABLET | Refills: 0 | Status: SHIPPED | OUTPATIENT
Start: 2021-01-04 | End: 2021-01-29

## 2021-01-04 NOTE — TELEPHONE ENCOUNTER
PCP sent telmisartan 80mg 1/4/21. Left msg to schedule appt if still has ear pain/symptoms. NEEDS APPT.

## 2021-01-28 DIAGNOSIS — E11.42 DIABETIC POLYNEUROPATHY ASSOCIATED WITH TYPE 2 DIABETES MELLITUS (HCC): ICD-10-CM

## 2021-01-29 RX ORDER — ATORVASTATIN CALCIUM 40 MG/1
TABLET, FILM COATED ORAL
Qty: 90 TABLET | Refills: 0 | Status: SHIPPED | OUTPATIENT
Start: 2021-01-29 | End: 2021-04-27

## 2021-01-29 RX ORDER — TELMISARTAN 80 MG/1
80 TABLET ORAL DAILY
Qty: 90 TABLET | Refills: 0 | Status: SHIPPED | OUTPATIENT
Start: 2021-01-29 | End: 2021-04-08

## 2021-02-23 ENCOUNTER — TELEPHONE (OUTPATIENT)
Dept: FAMILY MEDICINE CLINIC | Facility: CLINIC | Age: 51
End: 2021-02-23

## 2021-04-08 DIAGNOSIS — E11.42 DIABETIC POLYNEUROPATHY ASSOCIATED WITH TYPE 2 DIABETES MELLITUS (HCC): ICD-10-CM

## 2021-04-08 RX ORDER — TELMISARTAN 80 MG/1
80 TABLET ORAL DAILY
Qty: 90 TABLET | Refills: 0 | Status: SHIPPED | OUTPATIENT
Start: 2021-04-08 | End: 2021-07-06

## 2021-04-27 RX ORDER — ATORVASTATIN CALCIUM 40 MG/1
40 TABLET, FILM COATED ORAL NIGHTLY
Qty: 90 TABLET | Refills: 0 | Status: SHIPPED | OUTPATIENT
Start: 2021-04-27 | End: 2021-07-26

## 2021-06-02 ENCOUNTER — TELEPHONE (OUTPATIENT)
Dept: FAMILY MEDICINE CLINIC | Facility: CLINIC | Age: 51
End: 2021-06-02

## 2021-06-09 ENCOUNTER — TELEPHONE (OUTPATIENT)
Dept: FAMILY MEDICINE CLINIC | Facility: CLINIC | Age: 51
End: 2021-06-09

## 2021-06-09 NOTE — TELEPHONE ENCOUNTER
----- Message from Richardson Mauricio MD sent at 6/9/2021 8:51 AM CDT -----      metFORMIN HCl 500 MG Oral Tab    Prescription was sent but please instruct patient to make follow up appointment.   Routing as Dr. Jread Benitez is out of office

## 2021-06-09 NOTE — TELEPHONE ENCOUNTER
Fax received at [address] [place] [address] with following message. Alternative requested; send 90 day supply per insurance.     Current Outpatient Medications   Medication Sig Dispense Refill   • metFORMIN HCl 500 MG Oral Tab Take 1 tablet (500 mg total) by mouth 2 (two) t

## 2021-06-10 NOTE — TELEPHONE ENCOUNTER
Second voicemail left for patient to return phone call. Appointment is needed for Diabetes care. Last A1C from 2019. [facility]

## 2021-07-06 DIAGNOSIS — E11.42 DIABETIC POLYNEUROPATHY ASSOCIATED WITH TYPE 2 DIABETES MELLITUS (HCC): ICD-10-CM

## 2021-07-06 RX ORDER — TELMISARTAN 80 MG/1
80 TABLET ORAL DAILY
Qty: 90 TABLET | Refills: 0 | Status: SHIPPED | OUTPATIENT
Start: 2021-07-06 | End: 2021-10-04

## 2021-07-26 RX ORDER — ATORVASTATIN CALCIUM 40 MG/1
TABLET, FILM COATED ORAL
Qty: 90 TABLET | Refills: 0 | Status: SHIPPED | OUTPATIENT
Start: 2021-07-26 | End: 2021-10-18

## 2021-10-01 DIAGNOSIS — E11.42 DIABETIC POLYNEUROPATHY ASSOCIATED WITH TYPE 2 DIABETES MELLITUS (HCC): ICD-10-CM

## 2021-10-04 RX ORDER — TELMISARTAN 80 MG/1
80 TABLET ORAL DAILY
Qty: 90 TABLET | Refills: 0 | Status: SHIPPED | OUTPATIENT
Start: 2021-10-04 | End: 2021-11-02

## 2021-10-18 RX ORDER — ATORVASTATIN CALCIUM 40 MG/1
40 TABLET, FILM COATED ORAL NIGHTLY
Qty: 90 TABLET | Refills: 0 | Status: SHIPPED | OUTPATIENT
Start: 2021-10-18 | End: 2022-06-13

## 2021-11-01 DIAGNOSIS — E11.42 DIABETIC POLYNEUROPATHY ASSOCIATED WITH TYPE 2 DIABETES MELLITUS (HCC): ICD-10-CM

## 2021-11-02 RX ORDER — TELMISARTAN 80 MG/1
80 TABLET ORAL DAILY
Qty: 90 TABLET | Refills: 0 | Status: SHIPPED | OUTPATIENT
Start: 2021-11-02 | End: 2022-03-07

## 2022-01-24 DIAGNOSIS — E11.42 DIABETIC POLYNEUROPATHY ASSOCIATED WITH TYPE 2 DIABETES MELLITUS (HCC): ICD-10-CM

## 2022-01-24 RX ORDER — ATORVASTATIN CALCIUM 40 MG/1
40 TABLET, FILM COATED ORAL NIGHTLY
Qty: 90 TABLET | Refills: 0 | OUTPATIENT
Start: 2022-01-24

## 2022-01-24 NOTE — TELEPHONE ENCOUNTER
Please review; protocol failed/No Protcol    Requested Prescriptions   Pending Prescriptions Disp Refills    TELMISARTAN 80 MG Oral Tab [Pharmacy Med Name: TELMISARTAN 80 MG TABLET] 90 tablet 0     Sig: Take 1 tablet (80 mg total) by mouth daily.  Controlled type 2 diabetes mellitus without complication, without long-term current use of insulin ([facility] [address]  - Primary E11.9        Hypertensive Medications Protocol Failed - 1/24/2022  5:48 PM        Failed - CMP or BMP in past 12 months        Failed - Appointment in past 6 or next 3 months        Passed - GFR Non- > 50     Lab Results   Component Value Date    GFRNAA 101 06/05/2019                    ATORVASTATIN 40 MG Oral Tab [Pharmacy Med Name: ATORVASTATIN 40 MG TABLET] 90 tablet 0     Sig: TAKE 1 TABLET BY MOUTH EVERY DAY AT NIGHT        Cholesterol Medication Protocol Failed - 1/24/2022  5:48 PM        Failed - ALT in past 12 months        Failed - LDL in past 12 months        Failed - Last ALT < 80       Lab Results   Component Value Date    ALT 70 (H) 06/05/2019             Failed - Last LDL < 130     Lab Results   Component Value Date    LDL 58 06/05/2019               Failed - Appointment in past 12 or next 3 months              Recent Outpatient Visits              2 years ago Essential hypertension    [address], DO    Office Visit    3 years ago Controlled type 2 diabetes mellitus without complication, without long-term current use of insulin [facility])    [facility] [place]    Office Visit    3 years ago [facility] type [facility]    [facility] [address], DO    Office Visit    3 years ago Diabetes mellitus type 2 in nonobese [facility])    [facility] [address], [address], DO    Office Visit    3 years ago Diabetes mellitus type 2 [facility])    [address] [facility] [address], [place]    Office Visit

## 2022-01-24 NOTE — TELEPHONE ENCOUNTER
Please review. Protocol Failed / No Protocol.     Requested Prescriptions   Pending Prescriptions Disp Refills    ATORVASTATIN 40 MG Oral Tab [Pharmacy Med Name: ATORVASTATIN 40 MG TABLET] 90 tablet 0     Sig: TAKE 1 TABLET BY MOUTH EVERY DAY AT NIGHT        Cholesterol Medication Protocol Failed - 1/23/2022  6:53 AM        Failed - ALT in past 12 months        Failed - LDL in past 12 months        Failed - Last ALT < 80       Lab Results   Component Value Date    ALT 70 (H) 06/05/2019             Failed - Last LDL < 130     Lab Results   Component Value Date    LDL 58 06/05/2019               Failed - Appointment in past 12 or next 3 months              Recent Outpatient Visits              2 years ago Essential hypertension    Cristina Argueta, DO    Office Visit    3 years ago Controlled type 2 diabetes mellitus without complication, without long-term current use of insulin [facility])    [facility] [place] [place]    Office Visit    3 years ago Diabetes mellitus type 2 in nonobese [facility])    Cristina Argueta, DO    Office Visit    3 years ago Diabetes mellitus type 2 in nonobese [facility])    Cristina Argueta, DO    Office Visit    3 years [facility]    Cristina Argueta, DO    Office Visit

## 2022-01-25 RX ORDER — TELMISARTAN 80 MG/1
80 TABLET ORAL DAILY
Qty: 90 TABLET | Refills: 0 | OUTPATIENT
Start: 2022-01-25

## 2022-01-25 RX ORDER — ATORVASTATIN CALCIUM 40 MG/1
TABLET, FILM COATED ORAL
Qty: 90 TABLET | Refills: 0 | OUTPATIENT
Start: 2022-01-25

## 2022-02-23 NOTE — TELEPHONE ENCOUNTER
Please assist patient in making an appointment for further refills of metformin. He also has fasting blood work that needs to be done. Take 1 tablet (500 mg total) by mouth 2 (two) times daily with meals. , Normal, Disp-60 tablet, R-0  PATIENT NEEDS FASTING BLOOD TEST FOLLOWED BY APPOINTMENT.    Dispense: 60 tablet   Refills: 0 ordered   Pharmacy: [address] [place] [address]  [address] [facility], [phone], [phone] (Ph: [phone])   Order Details  Ordered on: 01/17/22   Authorizing provider: Andrei Paez DO

## 2022-03-07 RX ORDER — TELMISARTAN 80 MG/1
80 TABLET ORAL DAILY
Qty: 90 TABLET | Refills: 0 | Status: SHIPPED | OUTPATIENT
Start: 2022-03-07 | End: 2022-03-08

## 2022-03-08 ENCOUNTER — OFFICE VISIT (OUTPATIENT)
Dept: FAMILY MEDICINE CLINIC | Facility: CLINIC | Age: 52
End: 2022-03-08
Payer: COMMERCIAL

## 2022-03-08 VITALS
WEIGHT: 214 LBS | HEART RATE: 84 BPM | SYSTOLIC BLOOD PRESSURE: 144 MMHG | HEIGHT: 71 IN | DIASTOLIC BLOOD PRESSURE: 91 MMHG | BODY MASS INDEX: 29.96 KG/M2

## 2022-03-08 DIAGNOSIS — E11.42 DIABETIC POLYNEUROPATHY ASSOCIATED WITH TYPE 2 DIABETES MELLITUS (HCC): ICD-10-CM

## 2022-03-08 DIAGNOSIS — Z12.11 ENCOUNTER FOR SCREENING COLONOSCOPY: Primary | ICD-10-CM

## 2022-03-08 PROCEDURE — 3008F BODY MASS INDEX DOCD: CPT | Performed by: FAMILY MEDICINE

## 2022-03-08 PROCEDURE — 99214 OFFICE O/P EST MOD 30 MIN: CPT | Performed by: FAMILY MEDICINE

## 2022-03-08 PROCEDURE — 3077F SYST BP >= 140 MM HG: CPT | Performed by: FAMILY MEDICINE

## 2022-03-08 PROCEDURE — 3080F DIAST BP >= 90 MM HG: CPT | Performed by: FAMILY MEDICINE

## 2022-03-08 RX ORDER — TELMISARTAN AND HYDROCHLORTHIAZIDE 80; 25 MG/1; MG/1
1 TABLET ORAL DAILY
Qty: 90 TABLET | Refills: 1 | Status: SHIPPED | OUTPATIENT
Start: 2022-03-08

## 2022-03-08 RX ORDER — SILDENAFIL 50 MG/1
50 TABLET, FILM COATED ORAL
Qty: 12 TABLET | Refills: 3 | Status: SHIPPED | OUTPATIENT
Start: 2022-03-08

## 2022-03-08 RX ORDER — TELMISARTAN 80 MG/1
80 TABLET ORAL DAILY
Qty: 90 TABLET | Refills: 0 | Status: SHIPPED | OUTPATIENT
Start: 2022-03-08 | End: 2022-03-08

## 2022-03-08 NOTE — PROGRESS NOTES
Blood pressure (!) 144/91, pulse 84, height 5' 11\" (1.803 m), weight 214 lb (97.1 kg). Denies chest pain or dyspnea. Following up for numbness and discomfort of his feet bilaterally. Denies visual complaints. Has erectile dysfunction.     Objective feet with good pulses and intact skin    Assessment #1 diabetic neuropathy #2 noncompliance #3 erectile dysfunction #4 uncontrolled hypertension    Plan #1 patient has used cannabis with some relief discussed applying for the medical card for marijuana information given #2 compliance encouraged fasting blood test ordered #3 Viagra prescription #4 discontinue telmisartan start telmisartan/hydrochlorothiazide    Follow-up in 3 weeks    Screening colonoscopy information given

## 2022-06-06 DIAGNOSIS — E11.42 DIABETIC POLYNEUROPATHY ASSOCIATED WITH TYPE 2 DIABETES MELLITUS (HCC): ICD-10-CM

## 2022-06-09 ENCOUNTER — LAB ENCOUNTER (OUTPATIENT)
Dept: LAB | Age: 52
End: 2022-06-09
Attending: FAMILY MEDICINE
Payer: COMMERCIAL

## 2022-06-09 DIAGNOSIS — E11.42 DIABETIC POLYNEUROPATHY ASSOCIATED WITH TYPE 2 DIABETES MELLITUS (HCC): ICD-10-CM

## 2022-06-09 DIAGNOSIS — Z12.11 ENCOUNTER FOR SCREENING COLONOSCOPY: ICD-10-CM

## 2022-06-09 LAB
ALBUMIN SERPL-MCNC: 4.5 G/DL (ref 3.4–5)
ALBUMIN/GLOB SERPL: 1.3 {RATIO} (ref 1–2)
ALP LIVER SERPL-CCNC: 52 U/L
ALT SERPL-CCNC: 86 U/L
ANION GAP SERPL CALC-SCNC: 8 MMOL/L (ref 0–18)
AST SERPL-CCNC: 47 U/L (ref 15–37)
BILIRUB SERPL-MCNC: 0.5 MG/DL (ref 0.1–2)
BUN BLD-MCNC: 6 MG/DL (ref 7–18)
BUN/CREAT SERPL: 6.4 (ref 10–20)
CALCIUM BLD-MCNC: 9.5 MG/DL (ref 8.5–10.1)
CHLORIDE SERPL-SCNC: 105 MMOL/L (ref 98–112)
CHOLEST SERPL-MCNC: 185 MG/DL (ref ?–200)
CO2 SERPL-SCNC: 25 MMOL/L (ref 21–32)
COMPLEXED PSA SERPL-MCNC: 0.42 NG/ML (ref ?–4)
CREAT BLD-MCNC: 0.94 MG/DL
CREAT UR-SCNC: 192 MG/DL
EST. AVERAGE GLUCOSE BLD GHB EST-MCNC: 203 MG/DL (ref 68–126)
FASTING PATIENT LIPID ANSWER: NO
FASTING STATUS PATIENT QL REPORTED: NO
GLOBULIN PLAS-MCNC: 3.5 G/DL (ref 2.8–4.4)
GLUCOSE BLD-MCNC: 171 MG/DL (ref 70–99)
HBA1C MFR BLD: 8.7 % (ref ?–5.7)
HDLC SERPL-MCNC: 44 MG/DL (ref 40–59)
LDLC SERPL CALC-MCNC: 99 MG/DL (ref ?–100)
MICROALBUMIN UR-MCNC: 8.38 MG/DL
MICROALBUMIN/CREAT 24H UR-RTO: 43.6 UG/MG (ref ?–30)
NONHDLC SERPL-MCNC: 141 MG/DL (ref ?–130)
OSMOLALITY SERPL CALC.SUM OF ELEC: 288 MOSM/KG (ref 275–295)
POTASSIUM SERPL-SCNC: 3.9 MMOL/L (ref 3.5–5.1)
PROT SERPL-MCNC: 8 G/DL (ref 6.4–8.2)
SODIUM SERPL-SCNC: 138 MMOL/L (ref 136–145)
TRIGL SERPL-MCNC: 245 MG/DL (ref 30–149)
TSI SER-ACNC: 1.23 MIU/ML (ref 0.36–3.74)
VLDLC SERPL CALC-MCNC: 41 MG/DL (ref 0–30)

## 2022-06-09 PROCEDURE — 82570 ASSAY OF URINE CREATININE: CPT

## 2022-06-09 PROCEDURE — 36415 COLL VENOUS BLD VENIPUNCTURE: CPT

## 2022-06-09 PROCEDURE — 82043 UR ALBUMIN QUANTITATIVE: CPT

## 2022-06-09 PROCEDURE — 80053 COMPREHEN METABOLIC PANEL: CPT

## 2022-06-09 PROCEDURE — 80061 LIPID PANEL: CPT

## 2022-06-09 PROCEDURE — 83036 HEMOGLOBIN GLYCOSYLATED A1C: CPT

## 2022-06-09 PROCEDURE — 84443 ASSAY THYROID STIM HORMONE: CPT

## 2022-06-09 NOTE — TELEPHONE ENCOUNTER
Please review; Protocol Failed / No protocol.     Requested Prescriptions   Pending Prescriptions Disp Refills    METFORMIN 500 MG Oral Tab [Pharmacy Med Name: METFORMIN  MG TABLET] 180 tablet 0     Sig: TAKE 1 TABLET BY MOUTH TWICE A DAY WITH MEALS        Diabetes Medication Protocol Failed - 6/6/2022  2:20 PM        Failed - Last A1C < 7.5 and within past 6 months     Lab Results   Component Value Date    A1C 7.5 (H) 06/05/2019               Failed - GFR in the past 12 months        Passed - Appointment in past 6 or next 3 months        Passed - GFR Non- > 50     Lab Results   Component Value Date    GFRNAA 101 06/05/2019                        Recent Outpatient Visits              3 months ago Encounter for screening colonoscopy    [facility], [address], DO    Office Visit    3 years ago Essential hypertension    [facility], [address], DO    Office Visit    3 years ago Controlled type 2 diabetes mellitus without complication, without long-term current use of insulin [facility])    [facility] [place] for [address], Claudio Daily, [place]    Office Visit    3 years ago Diabetes mellitus type 2 in nonobese [facility])    [facility], [address], DO    Office Visit    3 years ago Diabetes mellitus type 2 in nonobese [facility])    [facility], [address], DO    Office Visit

## 2022-06-10 ENCOUNTER — TELEPHONE (OUTPATIENT)
Dept: FAMILY MEDICINE CLINIC | Facility: CLINIC | Age: 52
End: 2022-06-10

## 2022-06-10 NOTE — TELEPHONE ENCOUNTER
Left message for patient to return phone call, see below        ----- Message from Tania Cervantes PA-C sent at 6/9/2022 10:29 PM CDT -----  Please schedule for follow up with Dr Gurwinder Rhodes to discuss abnormal lab results.

## 2022-06-13 ENCOUNTER — TELEPHONE (OUTPATIENT)
Dept: FAMILY MEDICINE CLINIC | Facility: CLINIC | Age: 52
End: 2022-06-13

## 2022-06-13 DIAGNOSIS — E11.9 CONTROLLED TYPE 2 DIABETES MELLITUS WITHOUT COMPLICATION, WITHOUT LONG-TERM CURRENT USE OF INSULIN (HCC): Primary | ICD-10-CM

## 2022-06-13 RX ORDER — ATORVASTATIN CALCIUM 40 MG/1
40 TABLET, FILM COATED ORAL NIGHTLY
Qty: 90 TABLET | Refills: 0 | Status: SHIPPED | OUTPATIENT
Start: 2022-06-13

## 2022-07-08 NOTE — TELEPHONE ENCOUNTER
Patient requesting refill for     MELOXICAM 15 MG Oral Tab TAKE 1 TABLET BY MOUTH EVERY DAY Disp: 30 tablet Rfl: 1     Patient only has 3 pills left.      Please advise Wasn't able to connect with patient via phone, but spoke with Claudia from patient's outreach program (previous verbal ok from patient).  He is unable to make the Monday appointment as he has another doctor appointment that morning and they need at least two business days notice to set up transportation for him.  They're wondering if there is any way Dr Rogers has anything later in the week.  No openings, but will forward to Dr Rogers in case he can arrange fitting patient in somewhere.    Winifred Zimmer, KOBE

## 2022-09-07 DIAGNOSIS — E11.9 CONTROLLED TYPE 2 DIABETES MELLITUS WITHOUT COMPLICATION, WITHOUT LONG-TERM CURRENT USE OF INSULIN (HCC): ICD-10-CM

## 2022-09-09 RX ORDER — ATORVASTATIN CALCIUM 40 MG/1
40 TABLET, FILM COATED ORAL NIGHTLY
Qty: 90 TABLET | Refills: 3 | Status: SHIPPED | OUTPATIENT
Start: 2022-09-09 | End: 2023-08-10

## 2022-09-09 NOTE — TELEPHONE ENCOUNTER
Protocol failed or has No Protocol, please review  Requested Prescriptions   Pending Prescriptions Disp Refills    ATORVASTATIN 40 MG Oral Tab [Pharmacy Med Name: ATORVASTATIN 40 MG TABLET] 90 tablet 0     Sig: TAKE 1 TABLET BY MOUTH EVERY DAY AT NIGHT        Cholesterol Medication Protocol Failed - 9/7/2022  6:47 PM        Failed - Last ALT < 80       Lab Results   Component Value Date    ALT 86 (H) 06/09/2022             Passed - ALT in past 12 months        Passed - LDL in past 12 months        Passed - Last LDL < 130     Lab Results   Component Value Date    LDL 99 06/09/2022               Passed - In person appointment or virtual visit in the past 12 mos or appointment in next 3 mos       Recent Outpatient Visits              6 months ago Encounter for screening colonoscopy    [facility], [address], DO    Office Visit    3 years ago Essential hypertension    [facility], [address], DO    Office Visit    3 years ago Controlled type 2 diabetes mellitus without complication, without long-term current use of insulin [facility])    [place] [facility] [place]    Office Visit    3 years ago [facility] type 2 in [facility])    [employer], [address], DO    Office Visit    3 years ago Diabetes mellitus type 2 in [facility]    [facility], [address], DO    Office Visit                       Recent Outpatient Visits              6 months ago Encounter for screening colonoscopy    [facility], [address], DO    Office Visit    3 years ago Essential hypertension    [facility], [address], DO    Office Visit    3 years ago Controlled type 2 diabetes mellitus without complication, without long-term current use of insulin [facility])    [facility], Emanuel Triana    Office Visit    3 years ago Diabetes mellitus type 2 in nonobese [facility])    [address], DO    Office Visit    3 years ago Diabetes mellitus type 2 in nonobese [facility])    Ry Solorzano, Marcie Crocker, DO    Office Visit

## 2022-09-12 RX ORDER — TELMISARTAN AND HYDROCHLORTHIAZIDE 80; 25 MG/1; MG/1
1 TABLET ORAL DAILY
Qty: 90 TABLET | Refills: 0 | Status: SHIPPED | OUTPATIENT
Start: 2022-09-12 | End: 2023-04-05

## 2022-09-12 NOTE — TELEPHONE ENCOUNTER
Protocol failed or has No Protocol, please review  Requested Prescriptions   Pending Prescriptions Disp Refills    TELMISARTAN-HCTZ 80-25 MG Oral Tab [Pharmacy Med Name: Claudia Walker 80-25 MG TAB] 90 tablet 1     Sig: TAKE 1 TABLET BY MOUTH EVERY DAY        Hypertensive Medications Protocol Failed - 9/11/2022 11:20 AM        Failed - Last BP reading less than 140/90     BP Readings from Last 1 Encounters:  03/08/22 : (!) 144/91                Failed - In person appointment or virtual visit in the past 6 months       Recent Outpatient Visits              6 months ago Encounter for screening colonoscopy    [employer], [address], DO    Office Visit    3 years ago Essential hypertension    [facility], [address], DO    Office Visit    3 years ago Controlled type 2 diabetes mellitus without complication, without long-term current use of insulin [facility])    [facility] for MARISOL Duff [place]    Office Visit    3 years ago Diabetes mellitus type 2 in [facility]    [employer], [address], DO    Office Visit    3 years ago Diabetes mellitus type 2 in [facility]    [employer], [address], DO    Office Visit                 Passed - In person appointment in the past 12 or next 3 months       Recent Outpatient Visits              6 months ago Encounter for screening colonoscopy    [facility], [address], DO    Office Visit    3 years ago Essential hypertension    [facility], [address] [facility], DO    Office Visit    3 years ago Controlled type 2 diabetes mellitus without complication, without long-term current use of insulin [facility])    [facility] MARISOL [place]uff [place]    Office Visit    3 years ago Diabetes mellitus type 2 in [facility]    [address] [place] [address],2Nd Floor Family [facility] Vidal Hunt, DO    Office Visit    3 years ago Diabetes mellitus type 2 in nonobese [facility])    [facility], [address], DO    Office Visit                 Passed - CMP or BMP in past 6 months     Recent Results (from the past 4392 hour(s))   COMP METABOLIC PANEL (14)    Collection Time: 06/09/22 11:40 AM   Result Value Ref Range    Glucose 171 (H) 70 - 99 mg/dL    Sodium 138 136 - 145 mmol/L    Potassium 3.9 3.5 - 5.1 mmol/L    Chloride 105 98 - 112 mmol/L    CO2 25.0 21.0 - 32.0 mmol/L    Anion Gap 8 0 - 18 mmol/L    BUN 6 (L) 7 - 18 mg/dL    Creatinine 0.94 0.70 - 1.30 mg/dL    BUN/CREA Ratio 6.4 (L) 10.0 - 20.0    Calcium, Total 9.5 8.5 - 10.1 mg/dL    Calculated Osmolality 288 275 - 295 mOsm/kg    GFR, Non- 93 >=60    GFR, -American 108 >=60    ALT 86 (H) 16 - 61 U/L    AST 47 (H) 15 - 37 U/L    Alkaline Phosphatase 52 45 - 117 U/L    Bilirubin, Total 0.5 0.1 - 2.0 mg/dL    Total Protein 8.0 6.4 - 8.2 g/dL    Albumin 4.5 3.4 - 5.0 g/dL    Globulin  3.5 2.8 - 4.4 g/dL    A/G Ratio 1.3 1.0 - 2.0    Patient Fasting for CMP? No      *Note: Due to a large number of results and/or encounters for the requested time period, some results have not been displayed. A complete set of results can be found in Results Review.                  Passed - GFR > 50     No results found for: [facility]                    Recent Outpatient Visits              6 months ago Encounter for screening colonoscopy    [facility], [address], DO    Office Visit    3 years ago Essential hypertension    [facility], [address], DO    Office Visit    3 years ago Controlled type 2 diabetes mellitus without complication, without long-term current use of insulin [facility])    [facility] for Pooja Guido [address]. [place]    Office Visit    3 years ago Diabetes mellitus type 2 in nonobese [facility]) Ry Solorzano, Travon Reynaga, DO    Office Visit    3 years ago Diabetes mellitus type 2 in nonobese [facility])    Ry Solorzano, Travon Reynaga, DO    Office Visit

## 2023-01-04 ENCOUNTER — TELEPHONE (OUTPATIENT)
Dept: FAMILY MEDICINE CLINIC | Facility: CLINIC | Age: 53
End: 2023-01-04

## 2023-07-07 ENCOUNTER — TELEPHONE (OUTPATIENT)
Dept: FAMILY MEDICINE CLINIC | Facility: CLINIC | Age: 53
End: 2023-07-07

## 2023-07-07 DIAGNOSIS — E11.42 DIABETIC POLYNEUROPATHY ASSOCIATED WITH TYPE 2 DIABETES MELLITUS (HCC): ICD-10-CM

## 2023-07-07 RX ORDER — TELMISARTAN AND HYDROCHLORTHIAZIDE 80; 25 MG/1; MG/1
1 TABLET ORAL DAILY
Qty: 30 TABLET | Refills: 0 | Status: SHIPPED | OUTPATIENT
Start: 2023-07-07

## 2023-07-07 NOTE — TELEPHONE ENCOUNTER
Please review. Protocol failed / Has no protocol. 4/6/23 PATIENT NEEDS FASTING BLOOD TEST FOLLOWED BY APPOINTMENT. From Dr. Russel Aranda      Will make a phone attempt to patient to schedule an office visit with PCP. Requested Prescriptions   Pending Prescriptions Disp Refills    TELMISARTAN-HCTZ 80-25 MG Oral Tab [Pharmacy Med Name: Mindy Kong 80-25 MG TAB] 90 tablet 0     Sig: TAKE 1 TABLET BY MOUTH EVERY DAY       Hypertensive Medications Protocol Failed - 7/7/2023 12:01 AM        Failed - In person appointment in the past 12 or next 3 months     Recent Outpatient Visits              1 year ago Encounter for screening colonoscopy    [facility], [address], [place] Pai[place] lino, DO    Office Visit    4 years ago Essential hypertension    [facility], [address], [place] Paigehavijaya, Henry Braxton, DO    Office Visit    4 years ago Controlled type 2 diabetes mellitus without complication, without long-term current use of insulin ([facility]    [address], [address] [facility],[address] Floor, [place], [place], [address] Visit    4 years ago Diabetes mellitus type 2 in [facility])    [address], [place] Paigehavijaya, Henry Braxton, DO    Office Visit    4 years ago Diabetes mellitus type 2 in [facility])    [facility], [address], [place] ANTONIO PURI, DO    Office Visit                      Failed - Last BP reading less than 140/90     BP Readings from Last 1 Encounters:  03/08/22 : (!) 144/91              Failed - CMP or BMP in past 6 months     No results found for this or any previous visit (from the past 4392 hour(s)).             Failed - In person appointment or virtual visit in the past 6 months     Recent Outpatient Visits              1 year ago Encounter for screening colonoscopy    [facility], [address], [place] Henry Aranda, DO    Office Visit    4 years ago Essential hypertension [facility], [place], Gayland Actis, DO    Office Visit    4 years ago Controlled type 2 diabetes mellitus without complication, without long-term current use of insulin ([address]    [employer], [address], [place], [place]    Office Visit    4 years ago [facility]    [employer], [address], [place], Gayland Actis, DO    Office Visit    4 years ago Diabetes mellitus type 2 in [facility])    [facility], [address], [place] BALALASAMSON PURI, DO    Office Visit                      Failed - EGFRCR or GFRNAA > 50     GFR Evaluation              METFORMIN 500 MG Oral Tab [Pharmacy Med Name: METFORMIN  MG TABLET] 180 tablet 0     Sig: Take 1 tablet (500 mg total) by mouth 2 (two) times daily with meals. PATIENT NEEDS FASTING BLOOD TEST FOLLOWED BY APPOINTMENT.        Diabetes Medication Protocol Failed - 7/7/2023 12:01 AM        Failed - Last A1C < 7.5 and within past 6 months     Lab Results   Component Value Date    A1C 8.7 (H) 06/09/2022             Failed - In person appointment or virtual visit in the past 6 mos or appointment in next 3 mos     Recent Outpatient Visits              1 year ago Encounter for screening colonoscopy    [facility], [address], [place], Gayland Actis, DO    Office Visit    4 years ago Essential hypertension    [employer], [address], [place], Gayland Actis, DO    Office Visit    4 years ago Controlled type 2 diabetes mellitus without complication, without long-term current use of insulin ([address]    [employer], [address], [address]    Office Visit    4 years ago Diabetes mellitus type 2 in [facility])    [employer], [address], [place], Gayland Actis, DO    Office Visit    4 years ago Diabetes mellitus type 2 in [facility])    [facility] [address], [place], Helen PURI, DO    Office Visit                      Failed - EGFRCR or GFRNAA > 50     GFR Evaluation            Failed - GFR in the past 12 months              Recent Outpatient Visits              1 year ago Encounter for screening colonoscopy    [facility], [facility], [place], Darren Dale, DO    Office Visit    4 years ago Essential hypertension    [facility], [address], [place] PaigehavenDarren, DO    Office Visit    4 years ago Controlled type 2 diabetes mellitus without complication, without long-term current use of insulin ([facility]    [facility] Rodrigues, [address] [facility],[address] Floor, [place] MARISOL Reyes, [place]    Office Visit    4 years ago Diabetes mellitus type 2 in nonobese [facility])    Amaris Rodrigues, [facility], [place] PaineyDarren lilly, DO    Office Visit    4 years ago Diabetes mellitus type 2 in nonobese [facility])    Amaris Rodrigues, [facility], [place] PaiDarren lino, DO    Office Visit

## 2023-07-07 NOTE — TELEPHONE ENCOUNTER
4/6/23 PATIENT NEEDS FASTING BLOOD TEST FOLLOWED BY APPOINTMENT. From Dr. Aditi Werner    Please call patient to make an appointment.   Thank you

## 2023-08-08 DIAGNOSIS — E11.9 CONTROLLED TYPE 2 DIABETES MELLITUS WITHOUT COMPLICATION, WITHOUT LONG-TERM CURRENT USE OF INSULIN (HCC): ICD-10-CM

## 2023-08-08 DIAGNOSIS — E11.42 DIABETIC POLYNEUROPATHY ASSOCIATED WITH TYPE 2 DIABETES MELLITUS (HCC): ICD-10-CM

## 2023-08-09 ENCOUNTER — TELEPHONE (OUTPATIENT)
Dept: FAMILY MEDICINE CLINIC | Facility: CLINIC | Age: 53
End: 2023-08-09

## 2023-08-09 NOTE — TELEPHONE ENCOUNTER
Patient calling to follow up on getting refills, as he is almost out of his medications, and he is going out of town. Patient called back and he did schedule a follow up appointment for 8/26/2023.

## 2023-08-09 NOTE — TELEPHONE ENCOUNTER
metFORMIN 500 MG Oral Tab, Take 1 tablet (500 mg total) by mouth 2 (two) times daily with meals. PATIENT NEEDS FASTING BLOOD TEST FOLLOWED BY APPOINTMENT., Disp: 60 tablet, Rfl: 0    Telmisartan-HCTZ 80-25 MG Oral Tab, Take 1 tablet by mouth daily.  PATIENT NEEDS FASTING BLOOD TEST FOLLOWED BY APPOINTMENT., Disp: 30 tablet, Rfl: 0

## 2023-08-09 NOTE — TELEPHONE ENCOUNTER
Please review; protocol failed. No active /future labs noted   Patient is going out of town would like refills. Future Appointments   Date Time Provider Mauri Hilario   8/26/2023 10:15 AM Chaz Noel PA-C [facility] [place]         Requested Prescriptions   Pending Prescriptions Disp Refills    atorvastatin 40 MG Oral Tab [Pharmacy Med Name: ATORVASTATIN 40 MG TABLET] 30 tablet 0     Sig: Take 1 tablet (40 mg total) by mouth nightly. appointment needed for further refills.        Cholesterol Medication Protocol Failed - 8/9/2023  3:44 PM        Failed - ALT in past 12 months        Failed - LDL in past 12 months        Failed - Last ALT < 80     Lab Results   Component Value Date    ALT 86 (H) 06/09/2022             Failed - Last LDL < 130     Lab Results   Component Value Date    LDL 99 06/09/2022             Passed - In person appointment or virtual visit in the past 12 mos or appointment in next 3 mos     Recent Outpatient Visits              1 year ago Encounter for screening colonoscopy    [employer], [address], [place], Estefanía Pate, DO    Office Visit    4 years ago Essential hypertension    [employer], [address], [place] PaigehavenEstefanía, DO    Office Visit    4 years ago Controlled type 2 diabetes mellitus without complication, without long-term current use of insulin ([facility]    [employer], [address] [facility],[address], [address]    Office Visit    4 years ago Diabetes mellitus type 2 in [facility])    [employer], [address], [place] PaigehavenEstefanía, DO    Office Visit    4 years ago Diabetes mellitus type 2 in [facility])    [employer], [address], [place] Estefanía Sánchez, DO    Office Visit          Future Appointments         Provider Department Appt Notes    In 2 weeks KRISTOPHER [employer],  Chana Stone follow up - medication refills Recent Outpatient Visits              1 year ago Encounter for screening colonoscopy    [facility], [facility], [place], Talia Mccoy, DO    Office Visit    4 years ago Essential hypertension    [facility] [address], [place], Talia Mccoy, DO    Office Visit    4 years ago Controlled type 2 diabetes mellitus without complication, without long-term current use of insulin ([facility]    Nicola Vazquez, [address] [facility] [address], [place] MARISOL Reyes, [place]    Office Visit    4 years ago Diabetes mellitus type 2 in nonobese [facility])    Nicola Vazquez [facility], [place], Talia Mccoy,     Office Visit    4 years ago Diabetes mellitus type 2 in [facility])    Nicola Vazquez, [address], [place] Talia Sánchez, DO    Office Visit          Future Appointments         Provider Department Appt Notes    In 2 weeks KRISTOPHER [address], [place] follow up - medication refills

## 2023-08-09 NOTE — TELEPHONE ENCOUNTER
Please review. Protocol failed / Has no protocol. Will make a phone attempt to patient to schedule an office visit with PCP.      Requested Prescriptions   Pending Prescriptions Disp Refills    ATORVASTATIN 40 MG Oral Tab [Pharmacy Med Name: ATORVASTATIN 40 MG TABLET] 90 tablet 1     Sig: TAKE 1 TABLET BY MOUTH EVERY DAY AT NIGHT       Cholesterol Medication Protocol Failed - 8/8/2023  3:43 PM        Failed - ALT in past 12 months        Failed - LDL in past 12 months        Failed - Last ALT < 80     Lab Results   Component Value Date    ALT 86 (H) 06/09/2022             Failed - Last LDL < 130     Lab Results   Component Value Date    LDL 99 06/09/2022             Failed - In person appointment or virtual visit in the past 12 mos or appointment in next 3 mos     Recent Outpatient Visits              1 year ago Encounter for screening colonoscopy    [facility], [employer], [place] Furl, Evgeny Dine, DO    Office Visit    4 years ago Essential hypertension    [address],[place] [address], [employer], [place] Gifford Furl, Evgeny Dine, DO    Office Visit    4 years ago Controlled type 2 diabetes mellitus without complication, without long-term current use of insulin ([facility]    [employer] [employer], [place] [address], [place], [place]    Office Visit    4 years ago Diabetes mellitus type 2 in [facility])    [address], [employer], [place] Gifford Furl, Vegeny Dine, DO    Office Visit    4 years ago Diabetes mellitus type 2 in [facility])    [address],[place] [address], [employer], [place] Gifford Furl, Evgeyn Dine, DO    Office Visit                            Recent Outpatient Visits              1 year ago Encounter for screening colonoscopy    [facility], [employer], [place] Ces[place]es, Evgeny Dine, DO    Office Visit    4 years ago Essential hypertension    [address], [address] [place] [address], [place], Evgeny Dine, DO    Office Visit    4 years ago Controlled type 2 diabetes mellitus without complication, without long-term current use of insulin ([facility]    [address] [facility], [facility], Zohaib DAMON    Office Visit    4 years ago Diabetes mellitus type 2 in nonobese [facility])    [address], [place], Gayland Actis, DO    Office Visit    4 years ago Diabetes mellitus type 2 in nonobese [facility])    [address], [address] [place] [address], [address] [place] [address], [facility], DO    Office Visit

## 2023-08-09 NOTE — TELEPHONE ENCOUNTER
Patient calling to follow up on getting refills, as he is almost out of his medication and he will be going out of town. Patient did schedule a follow up appointment for 8/26/23.

## 2023-08-10 RX ORDER — ATORVASTATIN CALCIUM 40 MG/1
40 TABLET, FILM COATED ORAL NIGHTLY
Qty: 30 TABLET | Refills: 0 | Status: SHIPPED | OUTPATIENT
Start: 2023-08-10

## 2023-08-10 RX ORDER — TELMISARTAN AND HYDROCHLORTHIAZIDE 80; 25 MG/1; MG/1
1 TABLET ORAL DAILY
Qty: 30 TABLET | Refills: 0 | Status: SHIPPED | OUTPATIENT
Start: 2023-08-10

## 2023-08-11 ENCOUNTER — MED REC SCAN ONLY (OUTPATIENT)
Dept: FAMILY MEDICINE CLINIC | Facility: CLINIC | Age: 53
End: 2023-08-11

## 2023-08-26 ENCOUNTER — OFFICE VISIT (OUTPATIENT)
Dept: FAMILY MEDICINE CLINIC | Facility: CLINIC | Age: 53
End: 2023-08-26

## 2023-08-26 VITALS
DIASTOLIC BLOOD PRESSURE: 94 MMHG | WEIGHT: 216 LBS | HEART RATE: 94 BPM | SYSTOLIC BLOOD PRESSURE: 146 MMHG | BODY MASS INDEX: 30.24 KG/M2 | HEIGHT: 71 IN

## 2023-08-26 DIAGNOSIS — I10 ESSENTIAL HYPERTENSION: ICD-10-CM

## 2023-08-26 DIAGNOSIS — E11.65 TYPE 2 DIABETES MELLITUS WITH HYPERGLYCEMIA, WITHOUT LONG-TERM CURRENT USE OF INSULIN (HCC): ICD-10-CM

## 2023-08-26 DIAGNOSIS — Z00.00 ROUTINE GENERAL MEDICAL EXAMINATION AT A HEALTH CARE FACILITY: Primary | ICD-10-CM

## 2023-08-26 DIAGNOSIS — H93.13 TINNITUS OF BOTH EARS: ICD-10-CM

## 2023-08-26 DIAGNOSIS — Z00.00 WELLNESS EXAMINATION: ICD-10-CM

## 2023-08-26 DIAGNOSIS — Z12.11 ENCOUNTER FOR SCREENING FOR MALIGNANT NEOPLASM OF COLON: ICD-10-CM

## 2023-08-26 DIAGNOSIS — Z12.5 SCREENING FOR MALIGNANT NEOPLASM OF PROSTATE: ICD-10-CM

## 2023-08-26 LAB
CARTRIDGE LOT#: ABNORMAL NUMERIC
HEMOGLOBIN A1C: 10.5 % (ref 4.3–5.6)

## 2023-08-26 RX ORDER — TELMISARTAN AND HYDROCHLORTHIAZIDE 80; 25 MG/1; MG/1
1 TABLET ORAL DAILY
Qty: 90 TABLET | Refills: 1 | Status: SHIPPED | OUTPATIENT
Start: 2023-08-26 | End: 2023-11-24

## 2023-08-27 PROBLEM — E11.65 TYPE 2 DIABETES MELLITUS WITH HYPERGLYCEMIA, WITHOUT LONG-TERM CURRENT USE OF INSULIN (HCC): Status: ACTIVE | Noted: 2019-01-25

## 2023-08-27 PROBLEM — E11.9 CONTROLLED TYPE 2 DIABETES MELLITUS WITHOUT COMPLICATION, WITHOUT LONG-TERM CURRENT USE OF INSULIN (HCC): Status: RESOLVED | Noted: 2019-01-25 | Resolved: 2023-08-27

## 2023-09-01 DIAGNOSIS — E11.65 TYPE 2 DIABETES MELLITUS WITH HYPERGLYCEMIA, WITHOUT LONG-TERM CURRENT USE OF INSULIN (HCC): ICD-10-CM

## 2023-09-22 ENCOUNTER — TELEPHONE (OUTPATIENT)
Dept: FAMILY MEDICINE CLINIC | Facility: CLINIC | Age: 53
End: 2023-09-22

## 2023-09-22 NOTE — TELEPHONE ENCOUNTER
Called pt regarding provider dr Tresa Peguero would for pt to schedule appoint re grading diabetes control and also due for couple of other care gaps order that needs to be completed.      If pt returns call please assist with pt scheduling with dr Tresa Peguero

## 2023-09-26 ENCOUNTER — TELEPHONE (OUTPATIENT)
Dept: FAMILY MEDICINE CLINIC | Facility: CLINIC | Age: 53
End: 2023-09-26

## 2023-09-26 DIAGNOSIS — I10 ESSENTIAL HYPERTENSION: Primary | ICD-10-CM

## 2023-09-26 NOTE — TELEPHONE ENCOUNTER
Patient requesting refill of:  Telmisartan-HCTZ 80-25 MG Oral Tab [     He states that he was told by [employer] that they have requested this several times and there may be an issue with insurance, please advise. Please send to:  [facility]/pharmacy #[id number] - [place] Valenzuela - [address]     Patient has only 2 pills left.

## 2023-09-26 NOTE — TELEPHONE ENCOUNTER
Filled on 8/26/23 - 90 days with fill   Spoke with pharmacy , was advised no longer covering combo med     Prior authorization initiated via [employer],await 1-5 days for decision      Office note attached & prevs meds noted & attached

## 2023-09-27 RX ORDER — LOSARTAN POTASSIUM AND HYDROCHLOROTHIAZIDE 25; 100 MG/1; MG/1
1 TABLET ORAL DAILY
Qty: 90 TABLET | Refills: 0 | Status: SHIPPED | OUTPATIENT
Start: 2023-09-27 | End: 2023-12-31

## 2023-09-27 NOTE — TELEPHONE ENCOUNTER
Denied    Request Reference Number: [id number]. TELMISA/HCTZ TAB 80-25MG is denied due to Plan Exclusion. For further questions, call [phone]. Case ID: [id number]      Payer: [employer]   Electronic appeal: Not supported   Appeals are not supported through ePA. Please refer to the fax case notice for appeals information and instructions.    View History

## 2023-11-20 ENCOUNTER — TELEPHONE (OUTPATIENT)
Dept: FAMILY MEDICINE CLINIC | Facility: CLINIC | Age: 53
End: 2023-11-20

## 2023-11-20 NOTE — TELEPHONE ENCOUNTER
Pharmacy stated that the med     metFORMIN 500 MG Oral Tab, Take 2 tablets    Has to be 90 day supply

## 2023-12-30 ENCOUNTER — TELEPHONE (OUTPATIENT)
Dept: FAMILY MEDICINE CLINIC | Facility: CLINIC | Age: 53
End: 2023-12-30

## 2023-12-30 DIAGNOSIS — I10 ESSENTIAL HYPERTENSION: ICD-10-CM

## 2023-12-31 RX ORDER — LOSARTAN POTASSIUM AND HYDROCHLOROTHIAZIDE 25; 100 MG/1; MG/1
1 TABLET ORAL DAILY
Qty: 90 TABLET | Refills: 0 | Status: SHIPPED | OUTPATIENT
Start: 2023-12-31

## 2023-12-31 NOTE — TELEPHONE ENCOUNTER
Please review; protocol failed/ No protocol     Will call patient to have labs drawn     Requested Prescriptions   Pending Prescriptions Disp Refills    LOSARTAN-HYDROCHLOROTHIAZIDE 100-25 MG Oral Tab [Pharmacy Med Name: LOSARTAN-HCTZ 100-25 MG TAB] 90 tablet 0     Sig: Take 1 tablet by mouth daily. PATIENT TO STOP TELMISARTAN/hydrochlorothiazide NEEDS BLOOD TEST FOLLOWED BY APPOINTMENT. Hypertensive Medications Protocol Failed - 12/30/2023  7:30 AM        Failed - Last BP reading less than 140/90     BP Readings from Last 1 Encounters:   08/26/23 (!) 146/94               Failed - CMP or BMP in past 6 months     No results found for this or any previous visit (from the past 4392 hour(s)).             Failed - EGFRCR or GFRNAA > 50     GFR Evaluation            Passed - In person appointment in the past 12 or next 3 months     Recent Outpatient Visits              4 months ago Routine general medical examination at a health care facility    [address], [place]a Co[place], [place]    Office Visit    1 year ago Encounter for screening colonoscopy    [facility], [facility], [place] Brianda Sánchez,     Office Visit    4 years ago Essential hypertension    [address], [place],     Office Visit    4 years ago Controlled type 2 diabetes mellitus without complication, without long-term current use of insulin ([facility]    [address], [address] [facility],[address], [place] [address], [place], [place]    Office Visit    4 years ago [facility]    [facility], [facility], [place] Qui[place]onez,     Office Visit                      Passed - In person appointment or virtual visit in the past 6 months     Recent Outpatient Visits              4 months ago Routine general medical examination at a health care facility    [address], [facility] Jim Stone Ryan Hutchinson PA-C    Office Visit    1 year ago Encounter for screening colonoscopy    [facility], [facility], [place] Cespedes, Ni Phoenix, DO    Office Visit    4 years ago Essential hypertension    [address], [place], Ni Phoenix, DO    Office Visit    4 years ago Controlled type 2 diabetes mellitus without complication, without long-term current use of insulin ([facility]    Lizet Espino, [address], [address]    Office Visit    4 years ago Diabetes mellitus type 2 in nonobese [facility])    Lizet Espino [facility], [place] Ces[place], Ni Phoenix, DO    Office Visit                           Recent Outpatient Visits              4 months ago Routine general medical examination at a health care facility    Lizet Espino, [facility], [place] [facility]    Office Visit    1 year ago Encounter for screening colonoscopy    [facility], [facility], [place], Ni Phoenix, DO    Office Visit    4 years ago Essential hypertension    [address], [place], Ni Phoenix, DO    Office Visit    4 years ago Controlled type 2 diabetes mellitus without complication, without long-term current use of insulin [facility])    Lizet Espino, [address], [place], [place]    Office Visit    4 years ago [facility] type 2 in [facility])    Lizet Espino, [facility], [address] [place] [address], Ni Phoenix, DO    Office Visit

## 2024-02-26 DIAGNOSIS — E11.9 CONTROLLED TYPE 2 DIABETES MELLITUS WITHOUT COMPLICATION, WITHOUT LONG-TERM CURRENT USE OF INSULIN (HCC): ICD-10-CM

## 2024-02-26 NOTE — TELEPHONE ENCOUNTER
90 day supply request of:       atorvastatin 40 MG Oral Tab, Take 1 tablet (40 mg total) by mouth nightly. appointment needed for further refills., Disp: 30 tablet, Rfl: 0

## 2024-02-27 NOTE — TELEPHONE ENCOUNTER
Please review protocol failed/ No protocol    Requested Prescriptions   Pending Prescriptions Disp Refills    atorvastatin 40 MG Oral Tab 30 tablet 0     Sig: Take 1 tablet (40 mg total) by mouth nightly. appointment needed for further refills.       Cholesterol Medication Protocol Failed - 2/26/2024  2:47 PM        Failed - ALT < 80     Lab Results   Component Value Date    ALT 86 (H) 06/09/2022             Failed - ALT resulted within past year        Failed - Lipid panel within past 12 months     Lab Results   Component Value Date    CHOLEST 185 06/09/2022    TRIG 245 (H) 06/09/2022    HDL 44 06/09/2022    LDL 99 06/09/2022    VLDL 41 (H) 06/09/2022    NONHDLC 141 (H) 06/09/2022             Passed - In person appointment or virtual visit in the past 12 mos or appointment in next 3 mos     Recent Outpatient Visits              6 months ago Routine general medical examination at a health care facility    [facility], [place] Feliberto Castanon PA-C    Office Visit    1 year ago Encounter for screening colonoscopy    [facility], [place] Cespedes, David B, DO    Office Visit    4 years ago Essential hypertension    [facility], [place] Cespedes, David B, DO    Office Visit    5 years ago Controlled type 2 diabetes mellitus without complication, without long-term current use of insulin ([facility])    [facility]    Office Visit    5 years ago Diabetes mellitus type 2 in nonobese ([facility])    [facility] [place] Floyd DO    Office Visit                           Recent Outpatient Visits              6 months ago Routine general medical examination at a health care facility    [facility]    Office Visit    1 year ago Encounter for screening colonoscopy    [facility]  [facility], [facility], [place] Cespedes, David B, DO    Office Visit    4 years ago Essential hypertension    [facility], [facility], [place] Ces[place]es, David B,     Office Visit    5 years ago Controlled type 2 diabetes mellitus without complication, without long-term current use of insulin ([facility])    [facility] T[facility], [place]    Office Visit    5 years ago Diabetes mellitus type 2 in nonobese ([facility])    [facility], [facility], [place] Cespedes, David B, DO    Office Visit

## 2024-02-28 RX ORDER — ATORVASTATIN CALCIUM 40 MG/1
40 TABLET, FILM COATED ORAL NIGHTLY
Qty: 30 TABLET | Refills: 0 | OUTPATIENT
Start: 2024-02-28

## 2024-03-26 DIAGNOSIS — I10 ESSENTIAL HYPERTENSION: ICD-10-CM

## 2024-03-27 RX ORDER — LOSARTAN POTASSIUM AND HYDROCHLOROTHIAZIDE 25; 100 MG/1; MG/1
1 TABLET ORAL DAILY
Qty: 30 TABLET | Refills: 0 | Status: SHIPPED | OUTPATIENT
Start: 2024-03-27

## 2024-03-27 NOTE — TELEPHONE ENCOUNTER
Please review; protocol failed/ has no protocol      Left message to patient to get labs done .    Requested Prescriptions   Pending Prescriptions Disp Refills    LOSARTAN-HYDROCHLOROTHIAZIDE 100-25 MG Oral Tab [Pharmacy Med Name: LOSARTAN-HCTZ 100-25 MG TAB] 90 tablet 0     Sig: TAKE 1 TABLET BY MOUTH DAILY. STOP TELMISARTAN/HYDROCHLOROTHIAZIDE NEEDS BLOOD TEST FOLLOWED BY APPOINTMENT.       Hypertension Medications Protocol Failed - 3/26/2024  8:31 AM        Failed - CMP or BMP in past 12 months        Failed - Last BP reading less than 140/90     BP Readings from Last 1 Encounters:   08/26/23 (!) 146/94               Failed - EGFRCR or GFRNAA > 50     GFR Evaluation            Passed - In person appointment or virtual visit in the past 12 mos or appointment in next 3 mos     Recent Outpatient Visits              7 months ago Routine general medical examination at a health care facility    [facility]    Office Visit    2 years ago Encounter for screening colonoscopy    [facility] [place] DO    Office Visit    4 years ago Essential hypertension    [facility]    Office Visit    5 years ago Controlled type 2 diabetes mellitus without complication, without long-term current use of insulin ([facility]    [facility]    Office Visit    5 years ago Diabetes mellitus type 2 in nonobese ([facility])    [facility] [place] DO    Office Visit                         Recent Outpatient Visits              7 months ago Routine general medical examination at a health care facility    [facility]    Office Visit    2 years ago Encounter for screening colonoscopy    [facility]  [facility], [place] Cespedes, David B,     Office Visit    4 years ago Essential hypertension    [facility], [facility], [place] Ces[place]es, David B,     Office Visit    5 years ago Controlled type 2 diabetes mellitus without complication, without long-term current use of insulin ([facility])    [facility], [facility], [place]    Office Visit    5 years ago Diabetes mellitus type 2 in nonobese ([facility])    [facility], [facility], [place],     Office Visit

## 2024-04-01 DIAGNOSIS — I10 ESSENTIAL HYPERTENSION: ICD-10-CM

## 2024-04-01 NOTE — TELEPHONE ENCOUNTER
Pharmacy states 90 day supply required by insurance. Please resend script for qty: 90        losartan-hydroCHLOROthiazide 100-25 MG Oral Tab, Take 1 tablet by mouth daily. Due for labs, Disp: 30 tablet, Rfl: 0

## 2024-04-03 NOTE — TELEPHONE ENCOUNTER
Please review; protocol failed/No Protocol    Please see pharmacy note:  Pharmacy states 90 day supply required by insurance. Please resend script for qty: 90          losartan-hydroCHLOROthiazide 100-25 MG Oral Tab, Take 1 tablet by mouth daily. Due for labs, Disp: 30 tablet, Rfl: 0        Was sent in for 30 day supply on 04/01/2024- left message for patient to complete labs-Please review if 90 day supply is appropriate    Requested Prescriptions   Pending Prescriptions Disp Refills    losartan-hydroCHLOROthiazide 100-25 MG Oral Tab 90 tablet 3     Sig: Take 1 tablet by mouth daily. Due for labs       Hypertension Medications Protocol Failed - 4/1/2024  3:47 PM        Failed - CMP or BMP in past 12 months        Failed - Last BP reading less than 140/90     BP Readings from Last 1 Encounters:   08/26/23 (!) 146/94               Failed - EGFRCR or GFRNAA > 50     GFR Evaluation            Passed - In person appointment or virtual visit in the past 12 mos or appointment in next 3 mos     Recent Outpatient Visits              7 months ago Routine general medical examination at a health care facility    [facility], [place] Nguyen, Minhxuyen, PA-C    Office Visit    2 years ago Encounter for screening colonoscopy    [facility] [place] DO    Office Visit    4 years ago Essential hypertension    [facility], [place] Ces[place]es, David B,     Office Visit    5 years ago Controlled type 2 diabetes mellitus without complication, without long-term current use of insulin ([facility])    [facility]    Office Visit    5 years ago Diabetes mellitus type 2 in nonobese ([facility])    [facility], [place] Cespedes, David B, DO    Office Visit                           Recent Outpatient Visits              7 months ago Routine general medical  examination at a health care facility    [facility], [facility], [place] PA-C    Office Visit    2 years ago Encounter for screening colonoscopy    [facility] [place],     Office Visit    4 years ago Essential hypertension    [facility], [place] Cespedes, David B,     Office Visit    5 years ago Controlled type 2 diabetes mellitus without complication, without long-term current use of insulin ([facility])    [facility]    Office Visit    5 years ago Diabetes mellitus type 2 in nonobese ([facility])    [facility] [place],     Office Visit

## 2024-04-04 RX ORDER — LOSARTAN POTASSIUM AND HYDROCHLOROTHIAZIDE 25; 100 MG/1; MG/1
1 TABLET ORAL DAILY
Qty: 90 TABLET | Refills: 0 | Status: SHIPPED | OUTPATIENT
Start: 2024-04-04

## 2024-04-10 ENCOUNTER — TELEPHONE (OUTPATIENT)
Dept: FAMILY MEDICINE CLINIC | Facility: CLINIC | Age: 54
End: 2024-04-10

## 2024-07-02 DIAGNOSIS — I10 ESSENTIAL HYPERTENSION: ICD-10-CM

## 2024-07-05 RX ORDER — LOSARTAN POTASSIUM AND HYDROCHLOROTHIAZIDE 25; 100 MG/1; MG/1
1 TABLET ORAL DAILY
Qty: 30 TABLET | Refills: 0 | Status: SHIPPED | OUTPATIENT
Start: 2024-07-05

## 2024-07-05 NOTE — TELEPHONE ENCOUNTER
Please review. Protocol Failed; No Protocol    Requested Prescriptions   Pending Prescriptions Disp Refills    LOSARTAN-HYDROCHLOROTHIAZIDE 100-25 MG Oral Tab [Pharmacy Med Name: LOSARTAN-HCTZ 100-25 MG TAB] 90 tablet 0     Sig: Take 1 tablet by mouth daily. PATIENT NEEDS FASTING BLOOD TEST FOLLOWED BY APPOINTMENT.       Hypertension Medications Protocol Failed - 7/2/2024 12:46 AM        Failed - CMP or BMP in past 12 months        Failed - Last BP reading less than 140/90     BP Readings from Last 1 Encounters:   08/26/23 (!) 146/94               Failed - EGFRCR or GFRNAA > 50     GFR Evaluation            Passed - In person appointment or virtual visit in the past 12 mos or appointment in next 3 mos     Recent Outpatient Visits              10 months ago Routine general medical examination at a health care facility    [facility], [place] Nguyen, Minhxuyen, PA-C    Office Visit    2 years ago Encounter for screening colonoscopy    [facility], [place] Cespedes, David B, DO    Office Visit    5 years ago Essential hypertension    [facility], [place] Ces[place]es, David B, DO    Office Visit    5 years ago Controlled type 2 diabetes mellitus without complication, without long-term current use of insulin ([facility])    [facility]    Office Visit    5 years ago Diabetes mellitus type 2 in nonobese ([facility])    [facility], [place] Ces[place]es, David B, DO    Office Visit                               Recent Outpatient Visits              10 months ago Routine general medical examination at a health care facility    [facility], [place] Nguyen, Minhxuyen, PA-C    Office Visit    2 years ago Encounter for screening colonoscopy    [facility], [place] Ces[place]es, David B, DO    Office Visit    5  years ago Essential hypertension    [facility], [facility], [place],     Office Visit    5 years ago Controlled type 2 diabetes mellitus without complication, without long-term current use of insulin ([facility])    [facility], [place]    Office Visit    5 years ago Diabetes mellitus type 2 in nonobese ([facility])    [facility], [facility], [place] Qui[place]onez,     Office Visit

## 2024-07-09 ENCOUNTER — TELEPHONE (OUTPATIENT)
Dept: FAMILY MEDICINE CLINIC | Facility: CLINIC | Age: 54
End: 2024-07-09

## 2024-07-09 DIAGNOSIS — I10 ESSENTIAL HYPERTENSION: ICD-10-CM

## 2024-07-09 NOTE — TELEPHONE ENCOUNTER
Per pharmacy medication needs to be in 90 day supply to cover it           losartan-hydroCHLOROthiazide 100-25 MG Oral Tab, Take 1 tablet by mouth daily. PATIENT NEEDS FASTING BLOOD TEST FOLLOWED BY APPOINTMENT., Disp: 30 tablet, Rfl: 0

## 2024-07-12 RX ORDER — LOSARTAN POTASSIUM AND HYDROCHLOROTHIAZIDE 25; 100 MG/1; MG/1
1 TABLET ORAL DAILY
Qty: 90 TABLET | Refills: 0 | OUTPATIENT
Start: 2024-07-12

## 2024-07-15 ENCOUNTER — TELEPHONE (OUTPATIENT)
Dept: FAMILY MEDICINE CLINIC | Facility: CLINIC | Age: 54
End: 2024-07-15

## 2024-07-15 NOTE — TELEPHONE ENCOUNTER
Patient is due for diabetic follow up w/PCP.  Please assist with scheduling.   Left message to call back.

## 2024-09-29 ENCOUNTER — TELEPHONE (OUTPATIENT)
Dept: FAMILY MEDICINE CLINIC | Facility: CLINIC | Age: 54
End: 2024-09-29

## 2024-09-29 DIAGNOSIS — E11.65 TYPE 2 DIABETES MELLITUS WITH HYPERGLYCEMIA, WITHOUT LONG-TERM CURRENT USE OF INSULIN (HCC): ICD-10-CM

## 2024-10-02 NOTE — TELEPHONE ENCOUNTER
Please review. Protocol Failed; No Protocol    No future appointments.     Routed to Patient  for assistance with appointment.     Requested Prescriptions   Pending Prescriptions Disp Refills    METFORMIN 500 MG Oral Tab [Pharmacy Med Name: METFORMIN  MG TABLET] 360 tablet 3     Sig: TAKE 2 TABLETS BY MOUTH TWICE A DAY WITH MEALS       Diabetes Medication Protocol Failed - 9/29/2024  6:54 AM        Failed - Last A1C < 7.5 and within past 6 months     Lab Results   Component Value Date    A1C 10.5 (A) 08/26/2023             Failed - In person appointment or virtual visit in the past 6 mos or appointment in next 3 mos     Recent Outpatient Visits              1 year ago Routine general medical examination at a health care facility    [facility], [place] Nguyen, Minhxuyen, PA-C    Office Visit    2 years ago Encounter for screening colonoscopy    [facility], [place] Cespedes, David B, DO    Office Visit    5 years ago Essential hypertension    [facility], [place] Cespedes, David B, DO    Office Visit    5 years ago Controlled type 2 diabetes mellitus without complication, without long-term current use of insulin ([facility]    [facility]    Office Visit    5 years ago Diabetes mellitus type 2 in nonobese ([facility])    [facility], [place] Cespedes, David B, DO    Office Visit                      Failed - Microalbumin procedure in past 12 months or taking ACE/ARB        Failed - EGFRCR or GFRNAA > 50     GFR Evaluation            Failed - GFR in the past 12 months                 Recent Outpatient Visits              1 year ago Routine general medical examination at a health care facility    [facility], [place] Nguyen, Minhxuyen, PA-C    Office Visit    2 years ago Encounter for  screening colonoscopy    [facility], [facility], [place] Cespedes, David B, DO    Office Visit    5 years ago Essential hypertension    [facility], [facility], [place] Cespedes, David B,     Office Visit    5 years ago Controlled type 2 diabetes mellitus without complication, without long-term current use of insulin ([facility]    [facility]    Office Visit    5 years ago Diabetes mellitus type 2 in nonobese ([facility])    [facility], [facility], [place] Ces[place], David B,     Office Visit

## (undated) DIAGNOSIS — E11.42 DIABETIC POLYNEUROPATHY ASSOCIATED WITH TYPE 2 DIABETES MELLITUS (HCC): ICD-10-CM

## (undated) NOTE — LETTER
06/13/19    Wily Hooker [address]      Dear Tiffanie Nieto,    [facility] records indicate that you have outstanding lab work and or testing that was ordered for you and has not yet been completed:  Orders Placed This Encounter     [id number]

## (undated) NOTE — LETTER
6/11/2021              Howard Hobson        [address] [facility]         Dear Reagan Meckel,    This letter is to inform you that our office has made several attempts to reach you by phone without success.   We were attempting to contact you by